# Patient Record
Sex: MALE | Race: WHITE | NOT HISPANIC OR LATINO | Employment: FULL TIME | URBAN - METROPOLITAN AREA
[De-identification: names, ages, dates, MRNs, and addresses within clinical notes are randomized per-mention and may not be internally consistent; named-entity substitution may affect disease eponyms.]

---

## 2020-12-18 ENCOUNTER — TELEPHONE (OUTPATIENT)
Dept: BEHAVIORAL HEALTH | Facility: CLINIC | Age: 35
End: 2020-12-18

## 2020-12-18 ENCOUNTER — HOSPITAL ENCOUNTER (INPATIENT)
Facility: CLINIC | Age: 35
LOS: 2 days | Discharge: HOME | End: 2020-12-20
Attending: FAMILY MEDICINE | Admitting: PSYCHIATRY & NEUROLOGY
Payer: COMMERCIAL

## 2020-12-18 DIAGNOSIS — Z20.828 CONTACT WITH AND (SUSPECTED) EXPOSURE TO OTHER VIRAL COMMUNICABLE DISEASES: ICD-10-CM

## 2020-12-18 DIAGNOSIS — F10.229 ALCOHOL DEPENDENCE WITH INTOXICATION WITH COMPLICATION (H): ICD-10-CM

## 2020-12-18 DIAGNOSIS — K21.9 GASTROESOPHAGEAL REFLUX DISEASE, UNSPECIFIED WHETHER ESOPHAGITIS PRESENT: Primary | ICD-10-CM

## 2020-12-18 LAB
ALBUMIN SERPL-MCNC: 4.4 G/DL (ref 3.4–5)
ALCOHOL BREATH TEST: 0.31 (ref 0–0.01)
ALP SERPL-CCNC: 128 U/L (ref 40–150)
ALT SERPL W P-5'-P-CCNC: 367 U/L (ref 0–70)
AMPHETAMINES UR QL SCN: NEGATIVE
ANION GAP SERPL CALCULATED.3IONS-SCNC: 17 MMOL/L (ref 3–14)
AST SERPL W P-5'-P-CCNC: 346 U/L (ref 0–45)
BARBITURATES UR QL: NEGATIVE
BASOPHILS # BLD AUTO: 0.1 10E9/L (ref 0–0.2)
BASOPHILS NFR BLD AUTO: 0.9 %
BENZODIAZ UR QL: NEGATIVE
BILIRUB SERPL-MCNC: 0.7 MG/DL (ref 0.2–1.3)
BUN SERPL-MCNC: 13 MG/DL (ref 7–30)
CALCIUM SERPL-MCNC: 9.1 MG/DL (ref 8.5–10.1)
CANNABINOIDS UR QL SCN: NEGATIVE
CHLORIDE SERPL-SCNC: 101 MMOL/L (ref 94–109)
CHOLEST SERPL-MCNC: 295 MG/DL
CO2 SERPL-SCNC: 20 MMOL/L (ref 20–32)
COCAINE UR QL: NEGATIVE
CREAT SERPL-MCNC: 0.86 MG/DL (ref 0.66–1.25)
DIFFERENTIAL METHOD BLD: ABNORMAL
EOSINOPHIL # BLD AUTO: 0 10E9/L (ref 0–0.7)
EOSINOPHIL NFR BLD AUTO: 0.3 %
ERYTHROCYTE [DISTWIDTH] IN BLOOD BY AUTOMATED COUNT: 12.7 % (ref 10–15)
ETHANOL SERPL-MCNC: 0.33 G/DL
ETHANOL UR QL SCN: POSITIVE
FLUAV+FLUBV RNA SPEC QL NAA+PROBE: NEGATIVE
FLUAV+FLUBV RNA SPEC QL NAA+PROBE: NEGATIVE
GFR SERPL CREATININE-BSD FRML MDRD: >90 ML/MIN/{1.73_M2}
GGT SERPL-CCNC: 614 U/L (ref 0–75)
GLUCOSE SERPL-MCNC: 85 MG/DL (ref 70–99)
HCT VFR BLD AUTO: 45.8 % (ref 40–53)
HDLC SERPL-MCNC: 102 MG/DL
HGB BLD-MCNC: 15.5 G/DL (ref 13.3–17.7)
IMM GRANULOCYTES # BLD: 0 10E9/L (ref 0–0.4)
IMM GRANULOCYTES NFR BLD: 0.2 %
LABORATORY COMMENT REPORT: NORMAL
LDLC SERPL CALC-MCNC: 170 MG/DL
LIPASE SERPL-CCNC: 156 U/L (ref 73–393)
LYMPHOCYTES # BLD AUTO: 1.8 10E9/L (ref 0.8–5.3)
LYMPHOCYTES NFR BLD AUTO: 20 %
MCH RBC QN AUTO: 34 PG (ref 26.5–33)
MCHC RBC AUTO-ENTMCNC: 33.8 G/DL (ref 31.5–36.5)
MCV RBC AUTO: 100 FL (ref 78–100)
MONOCYTES # BLD AUTO: 0.6 10E9/L (ref 0–1.3)
MONOCYTES NFR BLD AUTO: 6.5 %
NEUTROPHILS # BLD AUTO: 6.4 10E9/L (ref 1.6–8.3)
NEUTROPHILS NFR BLD AUTO: 72.1 %
NONHDLC SERPL-MCNC: 193 MG/DL
NRBC # BLD AUTO: 0 10*3/UL
NRBC BLD AUTO-RTO: 0 /100
OPIATES UR QL SCN: NEGATIVE
PLATELET # BLD AUTO: 255 10E9/L (ref 150–450)
POTASSIUM SERPL-SCNC: 3.9 MMOL/L (ref 3.4–5.3)
PROT SERPL-MCNC: 8.4 G/DL (ref 6.8–8.8)
RBC # BLD AUTO: 4.56 10E12/L (ref 4.4–5.9)
RSV RNA SPEC QL NAA+PROBE: NEGATIVE
SARS-COV-2 RNA SPEC QL NAA+PROBE: NEGATIVE
SODIUM SERPL-SCNC: 138 MMOL/L (ref 133–144)
SPECIMEN SOURCE: NORMAL
TRIGL SERPL-MCNC: 115 MG/DL
TSH SERPL DL<=0.005 MIU/L-ACNC: 0.48 MU/L (ref 0.4–4)
WBC # BLD AUTO: 8.8 10E9/L (ref 4–11)

## 2020-12-18 PROCEDURE — C9803 HOPD COVID-19 SPEC COLLECT: HCPCS | Performed by: FAMILY MEDICINE

## 2020-12-18 PROCEDURE — 250N000011 HC RX IP 250 OP 636: Performed by: FAMILY MEDICINE

## 2020-12-18 PROCEDURE — 258N000001 HC RX 258: Performed by: FAMILY MEDICINE

## 2020-12-18 PROCEDURE — 250N000013 HC RX MED GY IP 250 OP 250 PS 637: Performed by: NURSE PRACTITIONER

## 2020-12-18 PROCEDURE — 99285 EMERGENCY DEPT VISIT HI MDM: CPT | Mod: 25 | Performed by: FAMILY MEDICINE

## 2020-12-18 PROCEDURE — 83690 ASSAY OF LIPASE: CPT | Performed by: FAMILY MEDICINE

## 2020-12-18 PROCEDURE — 96374 THER/PROPH/DIAG INJ IV PUSH: CPT | Performed by: FAMILY MEDICINE

## 2020-12-18 PROCEDURE — 80320 DRUG SCREEN QUANTALCOHOLS: CPT | Performed by: FAMILY MEDICINE

## 2020-12-18 PROCEDURE — 82977 ASSAY OF GGT: CPT | Performed by: FAMILY MEDICINE

## 2020-12-18 PROCEDURE — 250N000009 HC RX 250: Performed by: FAMILY MEDICINE

## 2020-12-18 PROCEDURE — 87636 SARSCOV2 & INF A&B AMP PRB: CPT | Performed by: FAMILY MEDICINE

## 2020-12-18 PROCEDURE — 96375 TX/PRO/DX INJ NEW DRUG ADDON: CPT | Performed by: FAMILY MEDICINE

## 2020-12-18 PROCEDURE — 84443 ASSAY THYROID STIM HORMONE: CPT | Performed by: FAMILY MEDICINE

## 2020-12-18 PROCEDURE — 85025 COMPLETE CBC W/AUTO DIFF WBC: CPT | Performed by: FAMILY MEDICINE

## 2020-12-18 PROCEDURE — 258N000003 HC RX IP 258 OP 636: Performed by: FAMILY MEDICINE

## 2020-12-18 PROCEDURE — 250N000013 HC RX MED GY IP 250 OP 250 PS 637: Performed by: FAMILY MEDICINE

## 2020-12-18 PROCEDURE — 96361 HYDRATE IV INFUSION ADD-ON: CPT | Performed by: FAMILY MEDICINE

## 2020-12-18 PROCEDURE — 80061 LIPID PANEL: CPT | Performed by: FAMILY MEDICINE

## 2020-12-18 PROCEDURE — 82607 VITAMIN B-12: CPT | Performed by: FAMILY MEDICINE

## 2020-12-18 PROCEDURE — 80307 DRUG TEST PRSMV CHEM ANLYZR: CPT | Performed by: FAMILY MEDICINE

## 2020-12-18 PROCEDURE — 99285 EMERGENCY DEPT VISIT HI MDM: CPT | Performed by: FAMILY MEDICINE

## 2020-12-18 PROCEDURE — 82075 ASSAY OF BREATH ETHANOL: CPT | Performed by: FAMILY MEDICINE

## 2020-12-18 PROCEDURE — 80053 COMPREHEN METABOLIC PANEL: CPT | Performed by: FAMILY MEDICINE

## 2020-12-18 PROCEDURE — 128N000004 HC R&B CD ADULT

## 2020-12-18 PROCEDURE — 93005 ELECTROCARDIOGRAM TRACING: CPT | Performed by: FAMILY MEDICINE

## 2020-12-18 RX ORDER — LORAZEPAM 2 MG/ML
1 INJECTION INTRAMUSCULAR ONCE
Status: COMPLETED | OUTPATIENT
Start: 2020-12-18 | End: 2020-12-18

## 2020-12-18 RX ORDER — MAGNESIUM HYDROXIDE/ALUMINUM HYDROXICE/SIMETHICONE 120; 1200; 1200 MG/30ML; MG/30ML; MG/30ML
30 SUSPENSION ORAL ONCE
Status: COMPLETED | OUTPATIENT
Start: 2020-12-18 | End: 2020-12-18

## 2020-12-18 RX ORDER — ZOLPIDEM TARTRATE 10 MG/1
10 TABLET ORAL
Status: ON HOLD | COMMUNITY
End: 2020-12-20

## 2020-12-18 RX ORDER — MULTIPLE VITAMINS W/ MINERALS TAB 9MG-400MCG
1 TAB ORAL DAILY
Status: DISCONTINUED | OUTPATIENT
Start: 2020-12-19 | End: 2020-12-20 | Stop reason: HOSPADM

## 2020-12-18 RX ORDER — TRAZODONE HYDROCHLORIDE 50 MG/1
50 TABLET, FILM COATED ORAL
Status: DISCONTINUED | OUTPATIENT
Start: 2020-12-18 | End: 2020-12-20 | Stop reason: HOSPADM

## 2020-12-18 RX ORDER — MAGNESIUM HYDROXIDE/ALUMINUM HYDROXICE/SIMETHICONE 120; 1200; 1200 MG/30ML; MG/30ML; MG/30ML
30 SUSPENSION ORAL EVERY 4 HOURS PRN
Status: DISCONTINUED | OUTPATIENT
Start: 2020-12-18 | End: 2020-12-20 | Stop reason: HOSPADM

## 2020-12-18 RX ORDER — LANOLIN ALCOHOL/MO/W.PET/CERES
100 CREAM (GRAM) TOPICAL DAILY
Status: DISCONTINUED | OUTPATIENT
Start: 2020-12-19 | End: 2020-12-20 | Stop reason: HOSPADM

## 2020-12-18 RX ORDER — ATENOLOL 50 MG/1
50 TABLET ORAL DAILY PRN
Status: DISCONTINUED | OUTPATIENT
Start: 2020-12-18 | End: 2020-12-20 | Stop reason: HOSPADM

## 2020-12-18 RX ORDER — FOLIC ACID 1 MG/1
1 TABLET ORAL DAILY
Status: DISCONTINUED | OUTPATIENT
Start: 2020-12-19 | End: 2020-12-20 | Stop reason: HOSPADM

## 2020-12-18 RX ORDER — LORAZEPAM 1 MG/1
1 TABLET ORAL ONCE
Status: COMPLETED | OUTPATIENT
Start: 2020-12-18 | End: 2020-12-18

## 2020-12-18 RX ORDER — AMOXICILLIN 250 MG
1 CAPSULE ORAL 2 TIMES DAILY PRN
Status: DISCONTINUED | OUTPATIENT
Start: 2020-12-18 | End: 2020-12-20 | Stop reason: HOSPADM

## 2020-12-18 RX ORDER — LORAZEPAM 1 MG/1
1-4 TABLET ORAL EVERY 30 MIN PRN
Status: DISCONTINUED | OUTPATIENT
Start: 2020-12-18 | End: 2020-12-20 | Stop reason: HOSPADM

## 2020-12-18 RX ORDER — SODIUM CHLORIDE 9 MG/ML
INJECTION, SOLUTION INTRAVENOUS CONTINUOUS
Status: DISCONTINUED | OUTPATIENT
Start: 2020-12-18 | End: 2020-12-18

## 2020-12-18 RX ORDER — ONDANSETRON 4 MG/1
4 TABLET, ORALLY DISINTEGRATING ORAL EVERY 6 HOURS PRN
Status: DISCONTINUED | OUTPATIENT
Start: 2020-12-18 | End: 2020-12-20 | Stop reason: HOSPADM

## 2020-12-18 RX ORDER — LOPERAMIDE HCL 2 MG
2 CAPSULE ORAL 4 TIMES DAILY PRN
Status: DISCONTINUED | OUTPATIENT
Start: 2020-12-18 | End: 2020-12-20 | Stop reason: HOSPADM

## 2020-12-18 RX ORDER — ONDANSETRON 2 MG/ML
4 INJECTION INTRAMUSCULAR; INTRAVENOUS ONCE
Status: COMPLETED | OUTPATIENT
Start: 2020-12-18 | End: 2020-12-18

## 2020-12-18 RX ORDER — HYDROXYZINE HYDROCHLORIDE 25 MG/1
25 TABLET, FILM COATED ORAL EVERY 4 HOURS PRN
Status: DISCONTINUED | OUTPATIENT
Start: 2020-12-18 | End: 2020-12-20 | Stop reason: HOSPADM

## 2020-12-18 RX ADMIN — SODIUM CHLORIDE: 9 INJECTION, SOLUTION INTRAVENOUS at 21:10

## 2020-12-18 RX ADMIN — SODIUM CHLORIDE 1000 ML: 9 INJECTION, SOLUTION INTRAVENOUS at 19:20

## 2020-12-18 RX ADMIN — TRAZODONE HYDROCHLORIDE 50 MG: 50 TABLET ORAL at 23:24

## 2020-12-18 RX ADMIN — ONDANSETRON 4 MG: 2 INJECTION INTRAMUSCULAR; INTRAVENOUS at 19:08

## 2020-12-18 RX ADMIN — ALUMINUM HYDROXIDE, MAGNESIUM HYDROXIDE, AND SIMETHICONE 30 ML: 200; 200; 20 SUSPENSION ORAL at 21:40

## 2020-12-18 RX ADMIN — LORAZEPAM 1 MG: 1 TABLET ORAL at 21:40

## 2020-12-18 RX ADMIN — LORAZEPAM 2 MG: 1 TABLET ORAL at 23:24

## 2020-12-18 RX ADMIN — ALUMINUM HYDROXIDE, MAGNESIUM HYDROXIDE, AND SIMETHICONE 30 ML: 200; 200; 20 SUSPENSION ORAL at 19:08

## 2020-12-18 RX ADMIN — THIAMINE HYDROCHLORIDE: 100 INJECTION, SOLUTION INTRAMUSCULAR; INTRAVENOUS at 18:46

## 2020-12-18 RX ADMIN — LORAZEPAM 1 MG: 2 INJECTION INTRAMUSCULAR; INTRAVENOUS at 21:09

## 2020-12-18 RX ADMIN — ALUMINUM HYDROXIDE, MAGNESIUM HYDROXIDE, AND SIMETHICONE 30 ML: 200; 200; 20 SUSPENSION ORAL at 23:24

## 2020-12-18 ASSESSMENT — ACTIVITIES OF DAILY LIVING (ADL)
ORAL_HYGIENE: INDEPENDENT
HYGIENE/GROOMING: INDEPENDENT
DRESS: INDEPENDENT
LAUNDRY: WITH SUPERVISION

## 2020-12-18 ASSESSMENT — ENCOUNTER SYMPTOMS
VOMITING: 0
FEVER: 0
NAUSEA: 0
SHORTNESS OF BREATH: 0
ABDOMINAL PAIN: 0

## 2020-12-18 NOTE — ED TRIAGE NOTES
Pt here for detox and his last drink was at 10 am today.  Pt states no hx of seizures coming off alcohol.

## 2020-12-18 NOTE — ED PROVIDER NOTES
ED Provider Note  [facility]  History     Chief Complaint   Patient presents with     Drug / Alcohol Assessment     The history is provided by the patient and medical records.     Abhijit Saavedra is a 35 year old male who presents to the ED today seeking alcohol detox. He was last sober 12 years ago. He typically drinks a little before and after work. 7 months ago he tried to go sober for a week but no other periods of sobriety. 3 weeks ago he started drinking really bad. He is drinking 0.75L vodka a day.   The patient reports his last drink at 10 am. He denies h/o withdrawal seizure. He notes having had a spell but was awake and alert the whole time.  He has never done detox before. He is interested in treatment after detox. As for medical history he notes having had a rapid heart rate. He has not gone to his primary care doctor for this. No history of hypertension. Denies fever, cough, shortness of air, abdominal pain, dysuria, hematuria, rash, vomiting or diarrhea. No numbness, weakness, tingling. No suicidal or homicidal ideation.       Past Medical History  Past Medical History:   Diagnosis Date     Alcohol use      Obstructive sleep apnea      History reviewed. No pertinent surgical history.  No current outpatient medications on file.    Allergies   Allergen Reactions     Amoxicillin Rash     Penicillins Rash     Family History  No family history on file.  Social History   Social History     Tobacco Use     Smoking status: Never Smoker     Smokeless tobacco: Never Used   Substance Use Topics     Alcohol use: Yes     Alcohol/week: 10.0 standard drinks     Types: 10 Shots of liquor per week     Comment: heavily, daily      Drug use: Never      Past medical history, past surgical history, medications, allergies, family history, and social history were reviewed with the patient. No additional pertinent items.       Review of Systems   Constitutional: Negative for fever.   Respiratory:  Negative for shortness of breath.    Cardiovascular: Negative for chest pain.   Gastrointestinal: Negative for abdominal pain, nausea and vomiting.   Psychiatric/Behavioral: Negative for suicidal ideas.   All other systems reviewed and are negative.    A complete review of systems was performed with pertinent positives and negatives noted in the HPI, and all other systems negative.    Physical Exam   BP: 134/84  Pulse: 133  Temp: 98.1  F (36.7  C)  Resp: 18  SpO2: 100 %  Physical Exam  Constitutional:       General: He is not in acute distress.     Appearance: He is not diaphoretic.   HENT:      Head: Atraumatic.   Eyes:      General: No scleral icterus.     Pupils: Pupils are equal, round, and reactive to light.   Cardiovascular:      Heart sounds: Normal heart sounds.   Pulmonary:      Effort: No respiratory distress.      Breath sounds: Normal breath sounds.   Abdominal:      General: Bowel sounds are normal.      Palpations: Abdomen is soft.      Tenderness: There is no abdominal tenderness.   Musculoskeletal:         General: No tenderness.   Skin:     General: Skin is warm.      Findings: No rash.   Neurological:      General: No focal deficit present.      Mental Status: He is oriented to person, place, and time.      Motor: No weakness.      Coordination: Coordination normal.   Psychiatric:         Mood and Affect: Mood is anxious.         Thought Content: Thought content does not include homicidal or suicidal ideation.         ED Course      Procedures          Results for orders placed or performed during the hospital encounter of 12/18/20   Drug abuse screen 6 urine (tox)     Status: Abnormal   Result Value Ref Range    Amphetamine Qual Urine Negative NEG^Negative    Barbiturates Qual Urine Negative NEG^Negative    Benzodiazepine Qual Urine Negative NEG^Negative    Cannabinoids Qual Urine Negative NEG^Negative    Cocaine Qual Urine Negative NEG^Negative    Ethanol Qual Urine Positive (A) NEG^Negative     Opiates Qualitative Urine Negative NEG^Negative   CBC with platelets differential     Status: Abnormal   Result Value Ref Range    WBC 8.8 4.0 - 11.0 10e9/L    RBC Count 4.56 4.4 - 5.9 10e12/L    Hemoglobin 15.5 13.3 - 17.7 g/dL    Hematocrit 45.8 40.0 - 53.0 %     78 - 100 fl    MCH 34.0 (H) 26.5 - 33.0 pg    MCHC 33.8 31.5 - 36.5 g/dL    RDW 12.7 10.0 - 15.0 %    Platelet Count 255 150 - 450 10e9/L    Diff Method Automated Method     % Neutrophils 72.1 %    % Lymphocytes 20.0 %    % Monocytes 6.5 %    % Eosinophils 0.3 %    % Basophils 0.9 %    % Immature Granulocytes 0.2 %    Nucleated RBCs 0 0 /100    Absolute Neutrophil 6.4 1.6 - 8.3 10e9/L    Absolute Lymphocytes 1.8 0.8 - 5.3 10e9/L    Absolute Monocytes 0.6 0.0 - 1.3 10e9/L    Absolute Eosinophils 0.0 0.0 - 0.7 10e9/L    Absolute Basophils 0.1 0.0 - 0.2 10e9/L    Abs Immature Granulocytes 0.0 0 - 0.4 10e9/L    Absolute Nucleated RBC 0.0    Comprehensive metabolic panel     Status: Abnormal   Result Value Ref Range    Sodium 138 133 - 144 mmol/L    Potassium 3.9 3.4 - 5.3 mmol/L    Chloride 101 94 - 109 mmol/L    Carbon Dioxide 20 20 - 32 mmol/L    Anion Gap 17 (H) 3 - 14 mmol/L    Glucose 85 70 - 99 mg/dL    Urea Nitrogen 13 7 - 30 mg/dL    Creatinine 0.86 0.66 - 1.25 mg/dL    GFR Estimate >90 >60 mL/min/[1.73_m2]    GFR Estimate If Black >90 >60 mL/min/[1.73_m2]    Calcium 9.1 8.5 - 10.1 mg/dL    Bilirubin Total 0.7 0.2 - 1.3 mg/dL    Albumin 4.4 3.4 - 5.0 g/dL    Protein Total 8.4 6.8 - 8.8 g/dL    Alkaline Phosphatase 128 40 - 150 U/L     (H) 0 - 70 U/L     (H) 0 - 45 U/L   Lipase     Status: None   Result Value Ref Range    Lipase 156 73 - 393 U/L   Alcohol ethyl     Status: Abnormal   Result Value Ref Range    Ethanol g/dL 0.33 (HH) <0.01 g/dL   Asymptomatic Influenza A/B & SARS-CoV2 (COVID-19) Virus PCR Multiplex     Status: None    Specimen: Nasopharyngeal   Result Value Ref Range    Flu A/B & SARS-COV-2 PCR Source Nasopharyngeal      SARS-CoV-2 PCR Result NEGATIVE     Influenza A PCR Negative NEG^Negative    Influenza B PCR Negative NEG^Negative    Respiratory Syncytial Virus PCR Negative NEG^Negative    Flu A/B & SARS-CoV-2 PCR Comment (Note)    Lipid Profile     Status: Abnormal   Result Value Ref Range    Cholesterol 295 (H) <200 mg/dL    Triglycerides 115 <150 mg/dL    HDL Cholesterol 102 >39 mg/dL    LDL Cholesterol Calculated 170 (H) <100 mg/dL    Non HDL Cholesterol 193 (H) <130 mg/dL   GGT     Status: Abnormal   Result Value Ref Range     (H) 0 - 75 U/L   TSH with free T4 reflex     Status: None   Result Value Ref Range    TSH 0.48 0.40 - 4.00 mU/L   Vitamin B12     Status: None   Result Value Ref Range    Vitamin B12 618 193 - 986 pg/mL   Comprehensive metabolic panel     Status: Abnormal   Result Value Ref Range    Sodium 135 133 - 144 mmol/L    Potassium 3.8 3.4 - 5.3 mmol/L    Chloride 101 94 - 109 mmol/L    Carbon Dioxide 29 20 - 32 mmol/L    Anion Gap 5 3 - 14 mmol/L    Glucose 119 (H) 70 - 99 mg/dL    Urea Nitrogen 9 7 - 30 mg/dL    Creatinine 0.83 0.66 - 1.25 mg/dL    GFR Estimate >90 >60 mL/min/[1.73_m2]    GFR Estimate If Black >90 >60 mL/min/[1.73_m2]    Calcium 8.1 (L) 8.5 - 10.1 mg/dL    Bilirubin Total 0.9 0.2 - 1.3 mg/dL    Albumin 3.9 3.4 - 5.0 g/dL    Protein Total 7.6 6.8 - 8.8 g/dL    Alkaline Phosphatase 109 40 - 150 U/L     (H) 0 - 70 U/L     (H) 0 - 45 U/L   EKG 12-lead, tracing only     Status: None (Preliminary result)   Result Value Ref Range    Interpretation ECG Click View Image link to view waveform and result    Internal Medicine Adult IP Consult for [facility] Detox on 3A: Patient to be seen: Routine within 24 hrs; Call back #: [phone]; evaluate general medical condition; Consultant may enter orders: Yes; Requesting provider? Attending physician     Status: None ()    Narrative    Sunshine Levi PA-C     12/19/2020 10:46 AM  Brief Medicine Note    This note was made to fulfill the  consult order. Please see   progress note from writer for complete details.     Sunshine Levi PA-C  Hospitalist Service  Pager [phone]       Alcohol breath test POCT     Status: Abnormal   Result Value Ref Range    Alcohol Breath Test 0.314 (A) 0.00 - 0.01     Medications   LORazepam (ATIVAN) tablet 1-4 mg (1 mg Oral Given 12/19/20 1611)   atenolol (TENORMIN) tablet 50 mg (50 mg Oral Given 12/19/20 0159)   thiamine (B-1) tablet 100 mg (100 mg Oral Given 12/19/20 0856)   folic acid (FOLVITE) tablet 1 mg (1 mg Oral Given 12/19/20 0856)   multivitamin w/minerals (THERA-VIT-M) tablet 1 tablet (1 tablet Oral Given 12/19/20 0856)   nicotine (NICORETTE) gum 2 mg (has no administration in time range)   alum & mag hydroxide-simethicone (MAALOX) suspension 30 mL (30 mLs Oral Given 12/19/20 1216)   senna-docusate (SENOKOT-S/PERICOLACE) 8.6-50 MG per tablet 1 tablet (has no administration in time range)   traZODone (DESYREL) tablet 50 mg (50 mg Oral Given 12/19/20 0159)   hydrOXYzine (ATARAX) tablet 25 mg (25 mg Oral Given 12/19/20 0159)   ondansetron (ZOFRAN-ODT) ODT tab 4 mg (has no administration in time range)   loperamide (IMODIUM) capsule 2 mg (has no administration in time range)   pantoprazole (PROTONIX) EC tablet 40 mg (40 mg Oral Given 12/19/20 1611)   dextrose 5% and 0.45% NaCl 1,000 mL with Infuvite Adult 10 mL, thiamine 100 mg, folic acid 1 mg infusion ( Intravenous Stopped 12/18/20 1921)   0.9% sodium chloride BOLUS (0 mLs Intravenous Stopped 12/18/20 2040)   ondansetron (ZOFRAN) injection 4 mg (4 mg Intravenous Given 12/18/20 1908)   alum & mag hydroxide-simethicone (MAALOX) suspension 30 mL (30 mLs Oral Given 12/18/20 1908)   LORazepam (ATIVAN) injection 1 mg (1 mg Intravenous Given 12/18/20 2109)   LORazepam (ATIVAN) tablet 1 mg (1 mg Oral Given 12/18/20 2140)   alum & mag hydroxide-simethicone (MAALOX) suspension 30 mL (30 mLs Oral Given 12/18/20 2140)        Assessments & Plan (with Medical Decision  Making)       I have reviewed the nursing notes. I have reviewed the findings, diagnosis, plan and need for follow up with the patient.    Patient with chronic alcohol dependence acute intoxicated seeking detox at this time will be admitted to station 3 a for medically supervised detox and treatment.    Final diagnoses:   Alcohol dependence with intoxication with complication (H)   ,I, Diana Allen, am serving as a trained medical scribe to document services personally performed by Jakob Simpson MD based on the provider's statements to me on December 18, 2020.  This document has been checked and approved by the attending provider.    I, Jakob Simpson MD, was physically present and have reviewed and verified the accuracy of this note documented by Diana Allen, medical scribe.       --  Jakob Simpson MD  [facility] EMERGENCY DEPARTMENT  12/18/2020     Jakob Simpson MD  12/19/20 [id number]

## 2020-12-19 LAB
ALBUMIN SERPL-MCNC: 3.9 G/DL (ref 3.4–5)
ALP SERPL-CCNC: 109 U/L (ref 40–150)
ALT SERPL W P-5'-P-CCNC: 268 U/L (ref 0–70)
ANION GAP SERPL CALCULATED.3IONS-SCNC: 5 MMOL/L (ref 3–14)
AST SERPL W P-5'-P-CCNC: 183 U/L (ref 0–45)
BILIRUB SERPL-MCNC: 0.9 MG/DL (ref 0.2–1.3)
BUN SERPL-MCNC: 9 MG/DL (ref 7–30)
CALCIUM SERPL-MCNC: 8.1 MG/DL (ref 8.5–10.1)
CHLORIDE SERPL-SCNC: 101 MMOL/L (ref 94–109)
CO2 SERPL-SCNC: 29 MMOL/L (ref 20–32)
CREAT SERPL-MCNC: 0.83 MG/DL (ref 0.66–1.25)
GFR SERPL CREATININE-BSD FRML MDRD: >90 ML/MIN/{1.73_M2}
GLUCOSE SERPL-MCNC: 119 MG/DL (ref 70–99)
INTERPRETATION ECG - MUSE: NORMAL
POTASSIUM SERPL-SCNC: 3.8 MMOL/L (ref 3.4–5.3)
PROT SERPL-MCNC: 7.6 G/DL (ref 6.8–8.8)
SODIUM SERPL-SCNC: 135 MMOL/L (ref 133–144)
VIT B12 SERPL-MCNC: 618 PG/ML (ref 193–986)

## 2020-12-19 PROCEDURE — 36415 COLL VENOUS BLD VENIPUNCTURE: CPT | Performed by: PHYSICIAN ASSISTANT

## 2020-12-19 PROCEDURE — 99207 PR CONSULT E&M CHANGED TO INITIAL LEVEL: CPT | Performed by: PHYSICIAN ASSISTANT

## 2020-12-19 PROCEDURE — H2032 ACTIVITY THERAPY, PER 15 MIN: HCPCS

## 2020-12-19 PROCEDURE — 99222 1ST HOSP IP/OBS MODERATE 55: CPT | Mod: 95 | Performed by: PSYCHIATRY & NEUROLOGY

## 2020-12-19 PROCEDURE — 99222 1ST HOSP IP/OBS MODERATE 55: CPT | Performed by: PHYSICIAN ASSISTANT

## 2020-12-19 PROCEDURE — 250N000013 HC RX MED GY IP 250 OP 250 PS 637: Performed by: NURSE PRACTITIONER

## 2020-12-19 PROCEDURE — HZ2ZZZZ DETOXIFICATION SERVICES FOR SUBSTANCE ABUSE TREATMENT: ICD-10-PCS | Performed by: PSYCHIATRY & NEUROLOGY

## 2020-12-19 PROCEDURE — 128N000004 HC R&B CD ADULT

## 2020-12-19 PROCEDURE — 250N000013 HC RX MED GY IP 250 OP 250 PS 637: Performed by: PHYSICIAN ASSISTANT

## 2020-12-19 PROCEDURE — 80053 COMPREHEN METABOLIC PANEL: CPT | Performed by: PHYSICIAN ASSISTANT

## 2020-12-19 RX ORDER — IBUPROFEN 200 MG
400 TABLET ORAL DAILY PRN
Status: ON HOLD | COMMUNITY
End: 2021-07-16

## 2020-12-19 RX ORDER — PANTOPRAZOLE SODIUM 40 MG/1
40 TABLET, DELAYED RELEASE ORAL
Status: DISCONTINUED | OUTPATIENT
Start: 2020-12-19 | End: 2020-12-20 | Stop reason: HOSPADM

## 2020-12-19 RX ORDER — PANTOPRAZOLE SODIUM 40 MG/1
40 TABLET, DELAYED RELEASE ORAL
Status: DISCONTINUED | OUTPATIENT
Start: 2020-12-20 | End: 2020-12-19

## 2020-12-19 RX ORDER — CALCIUM CARBONATE 500 MG/1
2 TABLET, CHEWABLE ORAL
Status: ON HOLD | COMMUNITY
End: 2021-07-16

## 2020-12-19 RX ADMIN — FOLIC ACID 1 MG: 1 TABLET ORAL at 08:56

## 2020-12-19 RX ADMIN — HYDROXYZINE HYDROCHLORIDE 25 MG: 25 TABLET, FILM COATED ORAL at 22:27

## 2020-12-19 RX ADMIN — ALUMINUM HYDROXIDE, MAGNESIUM HYDROXIDE, AND SIMETHICONE 30 ML: 200; 200; 20 SUSPENSION ORAL at 20:31

## 2020-12-19 RX ADMIN — LORAZEPAM 2 MG: 1 TABLET ORAL at 12:16

## 2020-12-19 RX ADMIN — LORAZEPAM 1 MG: 1 TABLET ORAL at 16:11

## 2020-12-19 RX ADMIN — LORAZEPAM 2 MG: 1 TABLET ORAL at 01:59

## 2020-12-19 RX ADMIN — NICOTINE POLACRILEX 2 MG: 2 GUM, CHEWING BUCCAL at 18:07

## 2020-12-19 RX ADMIN — TRAZODONE HYDROCHLORIDE 50 MG: 50 TABLET ORAL at 22:27

## 2020-12-19 RX ADMIN — ATENOLOL 50 MG: 50 TABLET ORAL at 01:59

## 2020-12-19 RX ADMIN — MULTIPLE VITAMINS W/ MINERALS TAB 1 TABLET: TAB at 08:56

## 2020-12-19 RX ADMIN — HYDROXYZINE HYDROCHLORIDE 25 MG: 25 TABLET, FILM COATED ORAL at 01:59

## 2020-12-19 RX ADMIN — LORAZEPAM 2 MG: 1 TABLET ORAL at 06:07

## 2020-12-19 RX ADMIN — LORAZEPAM 2 MG: 1 TABLET ORAL at 08:56

## 2020-12-19 RX ADMIN — Medication 100 MG: at 08:56

## 2020-12-19 RX ADMIN — ALUMINUM HYDROXIDE, MAGNESIUM HYDROXIDE, AND SIMETHICONE 30 ML: 200; 200; 20 SUSPENSION ORAL at 12:16

## 2020-12-19 RX ADMIN — PANTOPRAZOLE SODIUM 40 MG: 40 TABLET, DELAYED RELEASE ORAL at 16:11

## 2020-12-19 RX ADMIN — TRAZODONE HYDROCHLORIDE 50 MG: 50 TABLET ORAL at 01:59

## 2020-12-19 ASSESSMENT — ACTIVITIES OF DAILY LIVING (ADL)
DRESS: INDEPENDENT
LAUNDRY: WITH SUPERVISION
ORAL_HYGIENE: INDEPENDENT
DRESS: INDEPENDENT
HYGIENE/GROOMING: INDEPENDENT
HYGIENE/GROOMING: INDEPENDENT
ORAL_HYGIENE: INDEPENDENT

## 2020-12-19 NOTE — PROGRESS NOTES
Internal Medicine Initial Visit      Abhijit Saavedra MRN# [id number]   YOB: 1985 Age: 35 year old   Date of Admission: 12/18/2020  PCP: No Ref-Primary, Physician    Referring Provider: Behavioral Health - Rita Carter MD  Reason for Visit: General Medical Evaluation          Assessment and Recommendations:   Abhijit Saavedra is a 35 year old with a history of alcohol use disorder and mild CLAUDETTE who is admitted to station 3A with alcohol withdrawal. Internal Medicine consultation was ordered by Dr. Carter for comanagement of alcohol withdrawal.        # Alcohol use disorder with acute withdrawal:  Has been drinking heavily for the past 1 month, drinking about 7-10 shots per day. Has had a history of alcohol dependence for ~12 years. Last drink was PTA. ABT 0.33 on admission. Utox positive for ethanol. Denies hx of CIPRIANO or DTs.   -Management per psychiatry team.   -Agree with MVI, folic acid and thiamine supplementation    # Mild CLAUDETTE:   Follows with sleep medicine. Had recent sleep study with mild CLAUDETTE. Was recommended to start on CPAP - he has yet to pick this up.   - Follow up with sleep medicine provider as outpatient to start CPAP for CLAUDETTE.     # AGMA, resolved:  AG 17, CO2 20 on admission. Glucose WNL. Likely in setting of alcohol ketosis/dehydration. S/p IVF in the ED. Repeat CMP today with improved AG to 5.   - Encourage fluid intake     # Transaminitis:  , , AlkP and T bili WNL. . CMP today with improved ALT to 268 and . Denies hx of IVDU, tylenol use or OTC herbal supplements. No abdominal pain, N/V. Likely in setting of alcohol use.   - Given LFT improvement today- no need for further LFT trends while in patient  - Recommend follow up with PCP within 7 days for repeat CMP to monitor LFTs to ensure resolution  - Please notify medicine with any new abdominal pain, N/V or fever >100.4F     # Abnormal Lipid Panel:  , , Non HDL cholesterol 193,  "Triglycerides 115.   - Follow up w/ PCP for repeat fasting lipid panel  - Lifestyle modifications discussed, alcohol cessation     # Elevated blood pressure in setting of alcohol withdrawal:  /94. Likely elevated in setting of alcohol withdrawal. Denies hx of HTN. Not on antihypertensives in the past.  - Monitor, please notify medicine if SBP >140 or DBP >90 if out of withdrawal period or SBP >175 or DBP >110     # Acid reflux  # Dark stools, resolved:  Patient reports episodic acid reflux. He also reports a history of dark/black colored stools ~2 weeks ago that was isolated event without recurrence. Denies current hematemesis, melena or hematochezia. Denies endoscopic procedures. No epigastric pain or ttp. Hgb stable at 15.5.   - Start Pantoprazole 40mg daily  - Follow up with PCP and referral to GI given upon discharge for follow-up     Medicine will follow up on BP peripherally, please page with any additional concerns.     Sunshine Levi PA-C  Hospitalist Service   Pager: [phone]     Reason for Admission:   Alcohol withdrawal         History of Present Illness:   History is obtained from the patient and medical record.     Abhijit Saavedra is a 35 year old with a history of alcohol use disorder and mild CLAUDETTE who is admitted to station 3A with alcohol withdrawal. Internal Medicine consultation was ordered by Dr. Carter for comanagement of alcohol withdrawal.      Patient reports a history of alcohol dependence for ~12 years. He has recently increased his drinking over the past 1 month and has been drinking 7-10 shots per day. Last drink PTA. He denies any hx of CIPRIANO or DTs. He states he is currently staying at his parents house. Recently had fight with his girlfriend. He states that it seems like his drinking has spiraled out of control. He is currently working as a  at a car dealership. He denies any tobacco use or IVDU or illicit drug use.     Currently, he reports feeling \"foggy,\" and " a little off balance due to his withdrawal symptoms. Denies any chest pain, SOB, abdominal pain, N/V. He denies any falls, headache or head injury. He notes intermittent acid reflux symptoms, and also notes that ~ 2 weeks ago he had a dark/black colored stool that was an isolated event without recurrence. Denies any hematemesis, hematochezia or current melena. He is not on a PPI.    He recently saw a sleep medicine provider and was diagnosed with mild CLAUDETTE. He is due to  a CPAP machine to start this at bedtime, but has not done so yet. He also has prescription for Ambien but rarely takes it.     He otherwise denies any dysuria, frequency, urgency, fever or chills or paresthesias.          Review of Systems:    ROS: 10 point ROS neg other than the symptoms noted above in the HPI.            Past Medical History:   Reviewed and updated in Epic.  Past Medical History:   Diagnosis Date     Alcohol use      Obstructive sleep apnea              Past Surgical History:   Reviewed and updated in Epic.  History reviewed. No pertinent surgical history.   Hand surgery ~ 20 years ago           Social History:   Lives with his parents.  for car dealership.   Social History     Tobacco Use     Smoking status: Never Smoker     Smokeless tobacco: Never Used   Substance Use Topics     Alcohol use: Yes     Alcohol/week: 10.0 standard drinks     Types: 10 Shots of liquor per week     Comment: heavily, daily      Drug use: Never             Family History:   Reviewed and updated in Epic.  No family history on file.          Allergies:     Allergies   Allergen Reactions     Amoxicillin Rash     Penicillins Rash             Medications:     Medications Prior to Admission   Medication Sig Dispense Refill Last Dose     zolpidem (AMBIEN) 10 MG tablet Take 10 mg by mouth nightly as needed for sleep Pt states that he has been taking 20mg the last two days   12/17/2020 at Unknown time        Current Facility-Administered  Medications   Medication     alum & mag hydroxide-simethicone (MAALOX) suspension 30 mL     atenolol (TENORMIN) tablet 50 mg     folic acid (FOLVITE) tablet 1 mg     hydrOXYzine (ATARAX) tablet 25 mg     loperamide (IMODIUM) capsule 2 mg     LORazepam (ATIVAN) tablet 1-4 mg     multivitamin w/minerals (THERA-VIT-M) tablet 1 tablet     nicotine (NICORETTE) gum 2 mg     ondansetron (ZOFRAN-ODT) ODT tab 4 mg     [START ON 12/20/2020] pantoprazole (PROTONIX) EC tablet 40 mg     senna-docusate (SENOKOT-S/PERICOLACE) 8.6-50 MG per tablet 1 tablet     thiamine (B-1) tablet 100 mg     traZODone (DESYREL) tablet 50 mg            Physical Exam:   Blood pressure (!) 135/94, pulse 109, temperature 98.7  F (37.1  C), temperature source Temporal, resp. rate 16, SpO2 95 %.  There is no height or weight on file to calculate BMI.  GENERAL: Alert and oriented x 3. NAD. Sitting up in bed resting comfortably.   HEENT: Anicteric sclera. Mucous membranes moist.   CV: RRR. S1, S2. No murmurs appreciated.   RESPIRATORY: Effort normal on room air. Lungs CTAB with no wheezing, rales, rhonchi.   GI: Abdomen soft, non distended, non tender. No guarding, rigidity or rebound tenderness. Negative duque's sign.   MUSCULOSKELETAL: No joint swelling or tenderness.  NEUROLOGICAL: No focal deficits. Moves all extremities. PERRLA. CN III-XII grossly intact. No dysdiadochokinesia.  EXTREMITIES: No peripheral edema. Intact bilateral pedal pulses.   SKIN: No jaundice. No rashes.           Data:   CBC:  Recent Labs   Lab Test 12/18/20  1820   WBC 8.8   RBC 4.56   HGB 15.5   HCT 45.8      MCH 34.0*   MCHC 33.8   RDW 12.7          CMP:  Recent Labs   Lab Test 12/18/20  1820      POTASSIUM 3.9   CHLORIDE 101   YNES 9.1   CO2 20   BUN 13   CR 0.86   GLC 85   *   *   BILITOTAL 0.7   ALBUMIN 4.4   PROTTOTAL 8.4   ALKPHOS 128       TSH:  TSH   Date Value Ref Range Status   12/18/2020 0.48 0.40 - 4.00 mU/L Final       Tox screen:  positive for ethanol     Unresulted Labs Ordered in the Past 30 Days of this Admission     No orders found for last 31 day(s).

## 2020-12-19 NOTE — PLAN OF CARE
Pt continues to be monitored for alcohol withdrawal. MSSA this = 8 and 8. He received lorazepam 2 mg x 2. He is pleasant and cooperative. He denies SI/SIB/HI.  His discharge plan has not been determined.  /85   Pulse 103   Temp 97.9  F (36.6  C) (Temporal)   Resp 16   SpO2 95%

## 2020-12-19 NOTE — H&P
"[facility], [place]   Psychiatric History and Physical  Admission date: 12/18/2020        Chief Complaint:   \"Alcohol\"        HPI:     The patient is a 34yo male with a history of alcohol use disorder and depression who was admitted after drinking up to one liter of hard alcohol daily. BAL was 0.33 on admission. LFTs are elevated. Says that he is \"not doing that well.\" Didn't sleep \"at all.\" Feels that he has poor coordination today and some nausea. Denies any withdrawal seizures. Feels that he has been hearing \"weird noises\" and his name being called. Feeling guilty about his relapse. Is open to CD treatment and anti-craving medications. Denies SI.      Per ER:    Abhijit Saavedra is a 35 year old male who presents to the ED today seeking alcohol detox. He was last sober 12 years ago. He typically drinks a little before and after work. 7 months ago he tried to go sober for a week but no other periods of sobriety. 3 weeks ago he started drinking really bad. He is drinking 0.75L vodka a day.   The patient reports his last drink at 10 am. He denies h/o withdrawal seizure. He notes having had a spell but was awake and alert the whole time.  He has never done detox before. He is interested in treatment after detox. As for medical history he notes having had a rapid heart rate. He has not gone to his primary care doctor for this. No history of hypertension. Denies fever, cough, shortness of air, abdominal pain, dysuria, hematuria, rash, vomiting or diarrhea. No numbness, weakness, tingling. No suicidal or homicidal ideation.           Past Psychiatric History:     No psychiatric hospitalizations.   No history of suicide attempts.   Says that he was on antidepressants recently \"but I didn't like them.\"         Substance Use and History:     Alcohol use disorder. Denies a history of withdrawal seizures.    Recent Ambien use.   Non smoker.         Past Medical History:   PAST MEDICAL HISTORY: " "History reviewed. No pertinent past medical history.    PAST SURGICAL HISTORY: History reviewed. No pertinent surgical history.          Family History:   FAMILY HISTORY: \"everyone drinks in my family.\"         Social History:   Please see the full psychosocial profile from the clinical treatment coordinator.   SOCIAL HISTORY:   Social History     Tobacco Use     Smoking status: Never Smoker     Smokeless tobacco: Never Used   Substance Use Topics     Alcohol use: Yes     Alcohol/week: 10.0 standard drinks     Types: 10 Shots of liquor per week     Comment: heavily, daily             Physical ROS:   The patient endorsed nausea. The remainder of 10-point review of systems was negative except as noted in HPI.         PTA Medications:     Medications Prior to Admission   Medication Sig Dispense Refill Last Dose     zolpidem (AMBIEN) 10 MG tablet Take 10 mg by mouth nightly as needed for sleep Pt states that he has been taking 20mg the last two days   12/17/2020 at Unknown time          Allergies:     Allergies   Allergen Reactions     Penicillins Rash          Labs:     Recent Results (from the past 48 hour(s))   Alcohol breath test POCT    Collection Time: 12/18/20  5:05 PM   Result Value Ref Range    Alcohol Breath Test 0.314 (A) 0.00 - 0.01   CBC with platelets differential    Collection Time: 12/18/20  6:20 PM   Result Value Ref Range    WBC 8.8 4.0 - 11.0 10e9/L    RBC Count 4.56 4.4 - 5.9 10e12/L    Hemoglobin 15.5 13.3 - 17.7 g/dL    Hematocrit 45.8 40.0 - 53.0 %     78 - 100 fl    MCH 34.0 (H) 26.5 - 33.0 pg    MCHC 33.8 31.5 - 36.5 g/dL    RDW 12.7 10.0 - 15.0 %    Platelet Count 255 150 - 450 10e9/L    Diff Method Automated Method     % Neutrophils 72.1 %    % Lymphocytes 20.0 %    % Monocytes 6.5 %    % Eosinophils 0.3 %    % Basophils 0.9 %    % Immature Granulocytes 0.2 %    Nucleated RBCs 0 0 /100    Absolute Neutrophil 6.4 1.6 - 8.3 10e9/L    Absolute Lymphocytes 1.8 0.8 - 5.3 10e9/L    Absolute " Monocytes 0.6 0.0 - 1.3 10e9/L    Absolute Eosinophils 0.0 0.0 - 0.7 10e9/L    Absolute Basophils 0.1 0.0 - 0.2 10e9/L    Abs Immature Granulocytes 0.0 0 - 0.4 10e9/L    Absolute Nucleated RBC 0.0    Comprehensive metabolic panel    Collection Time: 12/18/20  6:20 PM   Result Value Ref Range    Sodium 138 133 - 144 mmol/L    Potassium 3.9 3.4 - 5.3 mmol/L    Chloride 101 94 - 109 mmol/L    Carbon Dioxide 20 20 - 32 mmol/L    Anion Gap 17 (H) 3 - 14 mmol/L    Glucose 85 70 - 99 mg/dL    Urea Nitrogen 13 7 - 30 mg/dL    Creatinine 0.86 0.66 - 1.25 mg/dL    GFR Estimate >90 >60 mL/min/[1.73_m2]    GFR Estimate If Black >90 >60 mL/min/[1.73_m2]    Calcium 9.1 8.5 - 10.1 mg/dL    Bilirubin Total 0.7 0.2 - 1.3 mg/dL    Albumin 4.4 3.4 - 5.0 g/dL    Protein Total 8.4 6.8 - 8.8 g/dL    Alkaline Phosphatase 128 40 - 150 U/L     (H) 0 - 70 U/L     (H) 0 - 45 U/L   Lipase    Collection Time: 12/18/20  6:20 PM   Result Value Ref Range    Lipase 156 73 - 393 U/L   Alcohol ethyl    Collection Time: 12/18/20  6:20 PM   Result Value Ref Range    Ethanol g/dL 0.33 (HH) <0.01 g/dL   Lipid Profile    Collection Time: 12/18/20  6:20 PM   Result Value Ref Range    Cholesterol 295 (H) <200 mg/dL    Triglycerides 115 <150 mg/dL    HDL Cholesterol 102 >39 mg/dL    LDL Cholesterol Calculated 170 (H) <100 mg/dL    Non HDL Cholesterol 193 (H) <130 mg/dL   GGT    Collection Time: 12/18/20  6:20 PM   Result Value Ref Range     (H) 0 - 75 U/L   TSH with free T4 reflex    Collection Time: 12/18/20  6:20 PM   Result Value Ref Range    TSH 0.48 0.40 - 4.00 mU/L   Drug abuse screen 6 urine (tox)    Collection Time: 12/18/20  6:34 PM   Result Value Ref Range    Amphetamine Qual Urine Negative NEG^Negative    Barbiturates Qual Urine Negative NEG^Negative    Benzodiazepine Qual Urine Negative NEG^Negative    Cannabinoids Qual Urine Negative NEG^Negative    Cocaine Qual Urine Negative NEG^Negative    Ethanol Qual Urine Positive (A)  NEG^Negative    Opiates Qualitative Urine Negative NEG^Negative   EKG 12-lead, tracing only    Collection Time: 12/18/20  6:56 PM   Result Value Ref Range    Interpretation ECG Click View Image link to view waveform and result    Asymptomatic Influenza A/B & SARS-CoV2 (COVID-19) Virus PCR Multiplex    Collection Time: 12/18/20  7:14 PM    Specimen: Nasopharyngeal   Result Value Ref Range    Flu A/B & SARS-COV-2 PCR Source Nasopharyngeal     SARS-CoV-2 PCR Result NEGATIVE     Influenza A PCR Negative NEG^Negative    Influenza B PCR Negative NEG^Negative    Respiratory Syncytial Virus PCR Negative NEG^Negative    Flu A/B & SARS-CoV-2 PCR Comment (Note)           Physical and Psychiatric Examination:     BP (!) 138/95   Pulse 144   Temp 98.2  F (36.8  C) (Temporal)   Resp 16   SpO2 94%   Weight is 0 lbs 0 oz  There is no height or weight on file to calculate BMI.    Physical Exam:  I have reviewed the physical exam as documented by by the medical team and agree with findings and assessment and have no additional findings to add at this time.    Mental Status Exam:  Appearance: awake, alert and adequately groomed  Attitude:  cooperative  Eye Contact:  good  Mood:  anxious and depressed  Affect:  mood congruent  Speech:  clear, coherent  Language: fluent and intact in English  Psychomotor, Gait, Musculoskeletal:  no evidence of tardive dyskinesia, dystonia, or tics  Thought Process:  goal oriented  Associations:  no loose associations  Thought Content:  no evidence of suicidal ideation or homicidal ideation and recent AH  Insight:  fair  Judgement:  intact  Oriented to:  time, person, and place  Attention Span and Concentration:  intact  Recent and Remote Memory:  fair  Fund of Knowledge:  appropriate         Admission Diagnoses:   Alcohol withdrawal with perceptual disturbance         Assessment & Plan:     1) MSSA with Ativan.   2) Patient to be provided information on Naltrexone and Campral.   3) Patient open to  CD treatment.     Disposition Plan   Reason for ongoing admission: requires detoxification from substance that poses a risk of bodily harm during withdrawal period  Discharge location: Chemical dependency treatment facility  Discharge Medications: not ordered  Follow-up Appointments: not scheduled  Legal Status: voluntary    Video-Visit Details    Type of service:  Video Visit    Video Start Time (time video started): 0830    Video End Time (time video stopped): 0840    Originating Location (pt. Location): Other Station [facility]    Distant Location (provider location): Provider remote location    Mode of Communication:  Video Conference via [employer]    Physician has received verbal consent for a Video Visit from the patient? Yes    Entered by: Rod Adams on 12/19/2020 at 5:04 AM

## 2020-12-19 NOTE — TELEPHONE ENCOUNTER
S: [facility], 35/M, alcohol detox     Pt reports he hasn't been sober for 12 years   Pt reports .75 daily of vodka   Pt denies any hx of seizures, no DTs  Pt denies other sub use  Breath .33, walking and talking     Medically cleared, eating, drinking, ambulating indep   Patient cleared and ready for behavioral bed placement: Yes   No covid concerns, test ordered    A: Voluntary     R: 3A/Matthewuvali     900pm - Bud, on call provider, paged   902pm - Bud accepts   Pt placed in queue  903pm - unit charge Carlotta zimmerman took report, 930pm for report  908pm - ED charge notified via text page

## 2020-12-19 NOTE — PHARMACY-ADMISSION MEDICATION HISTORY
Admission Medication History Completed by Pharmacy    See [facility] Admission Navigator for allergy information, preferred outpatient pharmacy, prior to admission medications and immunization status.     Medication History Sources:     Patient, dispense report     Changes made to PTA medication list (reason):    Added:   o TUMS  o Ibuprofen    Deleted: None    Changed: None    Additional Information:    Patient was a good historian.     Patient reports taking Escitalopram for 3 weeks before discontinuing because he did not like the way it made him feel. He was also prescribed Hydroxyzine for anxiety/panic attacks but discontinued that due to the unwanted side effect of diarrhea.     Prior to Admission medications    Medication Sig Last Dose Taking? Auth Provider   calcium carbonate (TUMS) 500 MG chewable tablet Take 2 chew tab by mouth two to three times daily as needed for acid reflux Past Month at Unknown time Yes Reported, Patient   ibuprofen (ADVIL/MOTRIN) 200 MG tablet Take 400 mg by mouth daily as needed for headache/back pain Past Month at Unknown time Yes Reported, Patient   zolpidem (AMBIEN) 10 MG tablet Take 10 mg by mouth nightly as needed for sleep  12/17/2020 at Unknown time Yes Reported, Patient       Date completed: 12/19/20    Medication history completed by: Rosario Brennan

## 2020-12-19 NOTE — PLAN OF CARE
IOANA Saavedra is a 35 year old year old male with a chief complaint of alcohol problem      S = Situation:   Patient is a voluntary admission  for alcohol withdrawal  and detox      B  = Background:   Patient admitted from the ER seeking alcohol detox. Patient reports drinking 0.75-1 liter of alcohol daily for the past 12 years. Patient FABRICE in the ER 0.33 and urine drug screen positive for ethanol. Patient denies any other substance use. Patient denies any history of alcohol withdrawal seizures or DT's. This is patient's first alcohol detox admission. Patient denies any form of CD treatment and denies any inpatient mental health admissions. Patient reports he has diagnosed chronic insomnia, obstructive sleep apnea, tachycardia, anxiety, and depression.    A  =  Assessment:   Patient affect blunted, mood is anxious. Patient denies active SI, SIB, HI, or hallucinations. Patient did report he has had some recent life stressors including a break up, job change, and moving back with his parents. Patient reports he had some fleeting suicidal thoughts after the break up, but denies any at this time. Patient is also reporting having heartburn and states he has bad acid reflux every morning that he vomits. Patient MSSA score 12 during admission interview.    R =   Request or Recommendation:   Patient is on MSSA monitoring for alcohol withdrawal, with Lorazepam. Is on withdrawal precautions. Patient to be assessed in the morning by psychiatry, internal medicine, and case management.

## 2020-12-19 NOTE — PROGRESS NOTES
12/18/20 2153   Patient Belongings   Did you bring any home meds/supplements to the hospital?  No   Patient Belongings returned to patient at discharge   Belongings Search Yes   Clothing Search Yes   Second Staff Clarence BUCK & Ulises COTA   Comment see note   Storage Bin   Nothing  Medical Room Bin   [employer] medical insurance card, Passport   Security Envelope   Nothing  A             Admission:  I am responsible for any personal items that are not sent to the safe or pharmacy.  [facility] is not responsible for loss, theft or damage of any property in my possession.  Signature:  _________________________________ Date: _______  Time: _____                                              Staff Signature:  ____________________________ Date: ________  Time: _____      2nd Staff person, if patient is unable/unwilling to sign:    Signature: ________________________________ Date: ________  Time: _____   Discharge:  [facility] has returned all of my personal belongings:  Signature: _________________________________ Date: ________  Time: _____                                          Staff Signature:  ____________________________ Date: ________  Time: _____

## 2020-12-19 NOTE — PROGRESS NOTES
Pt.continues to be monitored for active alcohol withdrawal symptoms. He complained he barely slept throughout the night despite getting as needed medications. MSSA scores were 14&13. He got 2mg prn ativan X2. Denied SI/SIB/hallucinations. Will continue to monitor.

## 2020-12-19 NOTE — ED NOTES
ED to Behavioral Floor Handoff    SITUATION  Abhijit Saavedra is a 35 year old male who speaks English and lives in a home with family members The patient arrived in the ED by private car from emergency room with a complaint of Drug / Alcohol Assessment  .The patient's current symptoms started/worsened 1 week(s) ago and during this time the symptoms have increased.   In the ED, pt was diagnosed with   Final diagnoses:   Alcohol dependence with intoxication with complication (H)        Initial vitals were: BP: 134/84  Pulse: 133  Temp: 98.1  F (36.7  C)  Resp: 18  SpO2: 100 %   --------  Is the patient diabetic? No   If yes, last blood glucose? --     If yes, was this treated in the ED? --  --------  Is the patient inebriated (ETOH) Yes or Impaired on other substances? No  MSSA done? Yes  Last MSSA score: --    Were withdrawal symptoms treated? Yes  Does the patient have a seizure history? No. If yes, date of most recent seizure--  --------  Is the patient patient experiencing suicidal ideation? denies current or recent suicidal ideation     Homicidal ideation? denies current or recent homicidal ideation or behaviors.    Self-injurious behavior/urges? denies current or recent self injurious behavior or ideation.  ------  Was pt aggressive in the ED No  Was a code called No  Is the pt now cooperative? Yes  -------  Meds given in ED:   Medications   0.9% sodium chloride BOLUS (0 mLs Intravenous Stopped 12/18/20 2040)     Followed by   sodium chloride 0.9% infusion ( Intravenous New Bag 12/18/20 2110)   dextrose 5% and 0.45% NaCl 1,000 mL with Infuvite Adult 10 mL, thiamine 100 mg, folic acid 1 mg infusion ( Intravenous Stopped 12/18/20 1921)   ondansetron (ZOFRAN) injection 4 mg (4 mg Intravenous Given 12/18/20 1908)   alum & mag hydroxide-simethicone (MAALOX) suspension 30 mL (30 mLs Oral Given 12/18/20 1908)   LORazepam (ATIVAN) injection 1 mg (1 mg Intravenous Given 12/18/20 2109)      Family present during ED course?  No  Family currently present? No    BACKGROUND  Does the patient have a cognitive impairment or developmental disability? No  Allergies:   Allergies   Allergen Reactions     Penicillins Rash   .   Social demographics are   Social History     Socioeconomic History     Marital status: Single     Spouse name: None     Number of children: None     Years of education: None     Highest education level: None   Occupational History     None   Social Needs     Financial resource strain: None     Food insecurity     Worry: None     Inability: None     Transportation needs     Medical: None     Non-medical: None   Tobacco Use     Smoking status: Never Smoker     Smokeless tobacco: Never Used   Substance and Sexual Activity     Alcohol use: Yes     Alcohol/week: 10.0 standard drinks     Types: 10 Shots of liquor per week     Comment: heavily, daily      Drug use: Never     Sexual activity: None   Lifestyle     Physical activity     Days per week: None     Minutes per session: None     Stress: None   Relationships     Social connections     Talks on phone: None     Gets together: None     Attends Oriental orthodox service: None     Active member of club or organization: None     Attends meetings of clubs or organizations: None     Relationship status: None     Intimate partner violence     Fear of current or ex partner: None     Emotionally abused: None     Physically abused: None     Forced sexual activity: None   Other Topics Concern     None   Social History Narrative     None        ASSESSMENT  Labs results   Labs Ordered and Resulted from Time of ED Arrival Up to the Time of Departure from the ED   DRUG ABUSE SCREEN 6 CHEM DEP URINE ([facility]) - Abnormal; Notable for the following components:       Result Value    Ethanol Qual Urine Positive (*)     All other components within normal limits   CBC WITH PLATELETS DIFFERENTIAL - Abnormal; Notable for the following components:    MCH 34.0 (*)     All other components within normal limits    COMPREHENSIVE METABOLIC PANEL - Abnormal; Notable for the following components:    Anion Gap 17 (*)      (*)      (*)     All other components within normal limits   ALCOHOL ETHYL - Abnormal; Notable for the following components:    Ethanol g/dL 0.33 (*)     All other components within normal limits   ALCOHOL BREATH TEST POCT - Abnormal; Notable for the following components:    Alcohol Breath Test 0.314 (*)     All other components within normal limits   LIPASE   INFLUENZA A/B & SARS-COV2 PCR MULTIPLEX      Imaging Studies: No results found for this or any previous visit (from the past 24 hour(s)).   Most recent vital signs BP (!) 140/82   Pulse 144   Temp 97.1  F (36.2  C) (Oral)   Resp 18   SpO2 100%    Abnormal labs/tests/findings requiring intervention:---   Pain control: good  Nausea control: good    RECOMMENDATION  Are any infection precautions needed (MRSA, VRE, etc.)? No If yes, what infection? --  ---  Does the patient have mobility issues? independently. If yes, what device does the pt use? ---  ---  Is patient on 72 hour hold or commitment? No If on 72 hour hold, have hold and rights been given to patient? N/A  Are admitting orders written if after 10 p.m. ?N/A  Tasks needing to be completed:---     Jong Nowak RN   [facility]--    [phone] [place] [facility]   [phone] [facility]

## 2020-12-19 NOTE — CONSULTS
Brief Medicine Note    This note was made to fulfill the consult order. Please see progress note from writer for complete details.     Sunshine Levi PA-C  Hospitalist Service  Pager [phone]

## 2020-12-19 NOTE — PROGRESS NOTES
[facility] evaluation counselor met with pt to initiate discharge planning.  Pt has not had a substance use evaluation in the past. Pt He was last sober 12 years ago. He typically drinks a little before and after work. 7 months ago he tried to go sober for a week but no other periods of sobriety. 3 weeks ago he started drinking really bad. He is drinking 0.75L vodka a day.  Pt's living situation is  Living arrangements: mother and father. Pt's funding source is insurance list [place]: [id number]. Pt reports that he just went through a break up with his girlfriend and had to move back with his parents. He just started going back to AA meetings a couple days ago, and got a sponsor. 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    SOFÍA Moss

## 2020-12-20 VITALS
OXYGEN SATURATION: 98 % | SYSTOLIC BLOOD PRESSURE: 136 MMHG | RESPIRATION RATE: 16 BRPM | TEMPERATURE: 98 F | DIASTOLIC BLOOD PRESSURE: 90 MMHG | HEART RATE: 111 BPM

## 2020-12-20 PROCEDURE — 250N000013 HC RX MED GY IP 250 OP 250 PS 637: Performed by: NURSE PRACTITIONER

## 2020-12-20 PROCEDURE — 250N000013 HC RX MED GY IP 250 OP 250 PS 637: Performed by: PSYCHIATRY & NEUROLOGY

## 2020-12-20 PROCEDURE — 250N000013 HC RX MED GY IP 250 OP 250 PS 637: Performed by: PHYSICIAN ASSISTANT

## 2020-12-20 PROCEDURE — 99239 HOSP IP/OBS DSCHRG MGMT >30: CPT | Performed by: PSYCHIATRY & NEUROLOGY

## 2020-12-20 RX ORDER — MULTIPLE VITAMINS W/ MINERALS TAB 9MG-400MCG
1 TAB ORAL DAILY
Qty: 30 TABLET | Refills: 1 | Status: ON HOLD | OUTPATIENT
Start: 2020-12-21 | End: 2021-07-16

## 2020-12-20 RX ORDER — FOLIC ACID 1 MG/1
1 TABLET ORAL DAILY
Qty: 30 TABLET | Refills: 1 | Status: ON HOLD | OUTPATIENT
Start: 2020-12-21 | End: 2021-07-16

## 2020-12-20 RX ORDER — PANTOPRAZOLE SODIUM 40 MG/1
40 TABLET, DELAYED RELEASE ORAL
Qty: 30 TABLET | Refills: 1 | Status: ON HOLD | OUTPATIENT
Start: 2020-12-21 | End: 2021-07-16

## 2020-12-20 RX ORDER — LORAZEPAM 1 MG/1
1 TABLET ORAL ONCE
Status: COMPLETED | OUTPATIENT
Start: 2020-12-20 | End: 2020-12-20

## 2020-12-20 RX ADMIN — PANTOPRAZOLE SODIUM 40 MG: 40 TABLET, DELAYED RELEASE ORAL at 08:43

## 2020-12-20 RX ADMIN — MULTIPLE VITAMINS W/ MINERALS TAB 1 TABLET: TAB at 08:43

## 2020-12-20 RX ADMIN — LORAZEPAM 1 MG: 1 TABLET ORAL at 12:00

## 2020-12-20 RX ADMIN — Medication 100 MG: at 08:43

## 2020-12-20 RX ADMIN — LOPERAMIDE HYDROCHLORIDE 2 MG: 2 CAPSULE ORAL at 17:21

## 2020-12-20 RX ADMIN — FOLIC ACID 1 MG: 1 TABLET ORAL at 08:43

## 2020-12-20 ASSESSMENT — ACTIVITIES OF DAILY LIVING (ADL)
ORAL_HYGIENE: INDEPENDENT
DRESS: INDEPENDENT
HYGIENE/GROOMING: INDEPENDENT

## 2020-12-20 NOTE — PROGRESS NOTES
[facility] Evaluation Counselor met with pt and completed the Substance use evaluation. Pt signed Release of Information and [facility].  Pt's funding source is insurance list [employer]: [place].    SOFÍA Moss

## 2020-12-20 NOTE — PROGRESS NOTES
"Type Of Assessment: Comprehensive Assessment Update    Referral Source:  Self  MRN: [id number]    DATE OF SERVICE: December 20, 2020  Date of previous MADYSON Assessment: Not applicable.    PATIENT'S NAME: Abhijit Saavedra  Age: 35 year old  Last 4 SSN: [id number]  Sex: male   Gender Identity: male  Sexual Orientation: Heterosexual  Cultural Background: No, Denies any cultural influences or concerns that need to be considered for treatment  YOB: 1985  Current Address:   [address] [place] [address] [address] [facility] [address]  Patient Phone Number:  [phone]  Patient's E-Mail Contact:  [email or web address]  Funding: [facility]    Telemedicine Visit: The patient's condition can be safely assessed and treated via synchronous audio and visual telemedicine encounter.    Reason for Telemedicine Visit: Patient required immediate assessment / treatment   Originating Site (Patient Location): [address] [address] 3A Detox  Distant Site (Provider Location): [facility]: 3A Detox  Consent:  The patient/guardian has verbally consented to: the potential risks and benefits of telemedicine (video visit) versus in person care; bill my insurance or make self-payment for services provided; and responsibility for payment of non-covered services.   Mode of Communication:  Video Conference via Face to Face    START TIME: 12:50 PM  END TIME: 1:30 PM    As the provider I attest to compliance with applicable laws and regulations related to telemedicine.      Abhijit Saavedra was seen for a substance use disorder consult on 12/20/2020 by SOFÍA Moss.     Reason for Substance Use Disorder Consult:  Per patient \"Need profesional help after 10 plus years of drinking.' Abhijit Saavedra is a 35 year old male who presents to the ED today seeking alcohol detox. He was last sober 12 years ago. He typically drinks a little before and after work. 7 months ago he " tried to go sober for a week but no other periods of sobriety. 3 weeks ago he started drinking really bad. He is drinking 0.75L vodka a day.   The patient reports his last drink at 10 am. He denies h/o withdrawal seizure. He notes having had a spell but was awake and alert the whole time.  He has never done detox before. He is interested in treatment after detox. As for medical history he notes having had a rapid heart rate. He has not gone to his primary care doctor for this. No history of hypertension. Denies fever, cough, shortness of air, abdominal pain, dysuria, hematuria, rash, vomiting or diarrhea. No numbness, weakness, tingling. No suicidal or homicidal ideation.       Are you currently having severe withdrawal symptoms that are putting yourself or others in danger? No  Are you currently having severe medical problems that require immediate attention? No  Are you currently having severe emotional or behavioral problems that are putting yourself or others at risk of harm? No    Have you participated in prior substance use disorder evaluations? No   Have you ever been to detox, inpatient or outpatient treatment for substance related use? List previous treatment: No   Have you ever had a gambling problem or had treatment for compulsive gambling? No  Patient does not appear to be in severe withdrawal, an imminent safety risk to self or others, or requiring immediate medical attention and may proceed with the assessment interview.    Comprehensive Substance Use History   X X = Primary Drug Used Age of First Use    Pattern of Substance Use   (heaviest use in life and a use history within the past year if applicable) (DSM-5: Sx #3) Date /  Quantity of last use if within the past 30 days Withdrawal Potential?   Method of use  (Oral, smoked, snorted, IV, etc)    Alcohol   22 Age 21-23- Drinking pretty hard in college: 30 drinks per week.  Age 24-32-Moderate use:15-20 drinks a week.  Heavy drinking in the past 6  months: drinking 50 drinks per week 12/18/20 at 11:00 AM yes oral    Marijuana/Hashish   26 One or 2 times a year A year ago no smoked    Cocaine/Crack 30 One time. Thought dumb 5 years ago no snorted    Meth/Amphetamines   No use        Heroin   No use        Other Opiates/Synthetics   No use        Inhalants  No use        Benzodiazepines   No use        Hallucinogens   No use        Barbiturates/Sedatives/Hypnotics   No use        Over-the-Counter Drugs   No use        Other   No use        Nicotine   No use         Withdrawal symptoms: Have you had any of the following withdrawal symptoms?  Sweating (Rapid Pulse)  Shaky / Jittery / Tremors  Unable to Sleep  Agitation  Headache  Fatigue / Extremely Tired  Sad / Depressed Feeling  Nausea / Vomiting  Dizziness  Diarrhea  Diminished Appetite  Anxiety / Worried    Have you experienced any cravings?  Yes, Alcohol only  Have you had periods of abstinence?  Yes  What was your longest period? For one month.    Any circumstances that lead to relapse? Couldn't take peers pressure plus hd a party.    What activities have you engaged in when using alcohol/other drugs that could be hazardous to you or others? (i.e. driving a car/motorcycle/boat, operating machinery, unsafe sex, sharing needles for drugs or tattoos, etc ) The patient reported having a history of driving while under the influence of alcohol or drugs and 4 wheeling, driving to excess.    A description of any risk-taking behavior, including behavior that puts the client at risk of exposure to blood-borne or sexually transmitted diseases: Patient denies.    Arrests and legal interventions related to substance use: Patient denies.    A description of how the patient's use affected the their ability to function appropriately in a work setting: Fired fro drinking while driving in college.    A description of how the patient's use affected the their ability to function appropriately in an educational setting: Patient  denies.    Leisure time activities that are associated with substance use: Golf.    Do you think your substance use has become a problem for you? He agrees he has a substance abuse problem.    MEDICAL HISTORY    Physical or medical concerns or diagnoses: Overweight, alcoholism and sleep apnea.    Do you have any current medical treatment needs not being addressed by inpatient treatment?  Yes.    Do you need a referral for a medical provider? No.    Current medications:   Patient reports current meds as:   Outpatient Medications Marked as Taking for the 12/18/20 encounter (Hospital Encounter)   Medication Sig     calcium carbonate (TUMS) 500 MG chewable tablet Take 2 chew tab by mouth two to three times daily as needed for acid reflux     [START ON 12/21/2020] folic acid (FOLVITE) 1 MG tablet Take 1 tablet (1 mg) by mouth daily     ibuprofen (ADVIL/MOTRIN) 200 MG tablet Take 400 mg by mouth daily as needed for headache/back pain     [START ON 12/21/2020] multivitamin w/minerals (THERA-VIT-M) tablet Take 1 tablet by mouth daily     [START ON 12/21/2020] pantoprazole (PROTONIX) 40 MG EC tablet Take 1 tablet (40 mg) by mouth every morning (before breakfast)         Are you pregnant? NA, Male    Do you have any specific physical needs/accommodations? No    MENTAL HEALTH HISTORY:    Have you ever had  hospitalizations or treatment for mental health illness: No    Mental health history, including diagnosis and symptoms, and the effect on the client's ability to function: Patient denies.    Current mental health treatment including psychotropic medication needed to maintain stability: (Note: The assessment must utilize screening tools approved by the commissioner pursuant to section 245.4863 to identify whether the client screens positive for co-occurring disorders): None reported.    GAIN-SS Tool:    When was the last time that you had significant problems     with feeling very trapped, lonely, sad, blue, depressed or  hopeless about the future? Past Month  with sleep trouble, such as bad dreams, sleeping restlessly, or falling asleep during the day? Past Month  with feeling very anxious, nervous, tense, scared, panicked or like something bad was going to happen?  2 - 12 months ago  with becoming very distressed and upset when something reminded you of the past?  2 - 12 months ago  with thinking about ending your life or committing suicide?  Never    When was the last time that you did the following things two or more times?    Lied or conned to get things you wanted or to avoid having to do something?   Past Month  Had a hard time paying attention at school, work or home? Past Month  Had a hard time listening to instructions at school, work or home?  2 - 12 months ago  Were a bully or threatened other people?  1+ years ago  Started physical fights with other people?  Never    Have you ever been verbally, emotionally, physically or sexually abused?   No    Family history of substance use and misuse: My entire family drinks at events, typical [place] family. I've just became the worse drinker.    The patient's desire for family involvement in the treatment program:  Yes I do.    Level of family support: Parents are very supportive.    Social network in relation to expected support for recovery: Ex-girlfriend supports decision, it's one of the reason we broke up. Parents positive support decision to stay sober.    Are you currently in a significant relationship? No    Do you have any children (include living arrangements/custody/contact)?:  No.    What is your current living situation? Living with parents.    Are you employed/attending school? Employed as a . College graduate.    SUMMARY:    Ability to understand written treatment materials: Yes  Ability to understand patient rules and patient rights: Yes  Does the patient recognize needs related to substance use and is willing to follow treatment recommendations:  Yes  Does the patient have an opioid use disorder:  does not have a history of opiate use.    ASAM Dimension Scale Ratings:    Dimension 1: 0 Client displays full functioning with good ability to tolerate and cope with withdrawal discomfort. No signs or symptoms of intoxication or withdrawal or resolving signs or symptoms.  Dimension 2: 1 Client tolerates and frida with physical discomfort and is able to get the services that the client needs.  Dimension 3: 1 Client has impulse control and coping skills. Client presents a mild to moderate risk of harm to self or others or displays symptoms of emotional, behavioral or cognitive problems. Client has a mental health diagnosis and is stable. Client functions adequately in significant life areas.  Dimension 4: 1 Client is motivated with active reinforcement, to explore treatment and strategies for change, but ambivalent about illness or need for change.  Dimension 5: 4 No awareness of the negative impact of mental health problems or substance abuse. No coping skills to arrest mental health or addiction illnesses, or prevent relapse.  Dimension 6: 3 Client is not engaged in structured, meaningful activity and the client's peers, family, significant other, and living environment are unsupportive, or there is significant criminal justice system involvement.    Category of Substance Severity (ICD-10 Code / DSM 5 Code)     Alcohol Use Disorder Severe  (10.20) (303.90)   Cannabis Use Disorder The patient does not meet the criteria for a Cannabis use disorder.   Hallucinogen Use Disorder The patient does not meet the criteria for a Hallucinogen use disorder.   Inhalant Use Disorder The patient does not meet the criteria for an Inhalant use disorder.   Opioid Use Disorder The patient does not meet the criteria for an Opioid use disorder.   Sedative, Hypnotic, or Anxiolytic Use Disorder The patient does not meet the criteria for a Sedative/Hypnotic use disorder.   Stimulant  Related Disorder The patient does not meet the criteria for a Stimulant use disorder.   Tobacco Use Disorder The patient does not meet the criteria for a Tobacco use disorder.   Other (or unknown) Substance Use Disorder The patient does not meet the criteria for a Other (or unknown) Substance use disorder.     A problematic pattern of alcohol/drug use leading to clinically significant impairment or distress, as manifested by at least two of the following, occurring within a 12-month period:    1.) Alcohol/drug is often taken in larger amounts or over a longer period than was intended.  2.) There is a persistent desire or unsuccessful efforts to cut down or control alcohol/drug use  3.) A great deal of time is spent in activities necessary to obtain alcohol, use alcohol, or recover from its effects.  4.) Craving, or a strong desire or urge to use alcohol/drug  5.) Recurrent alcohol/drug use resulting in a failure to fulfill major role obligations at work, school or home.  6.) Continued alcohol use despite having persistent or recurrent social or interpersonal problems caused or exacerbated by the effects of alcohol/drug.  7.) Important social, occupational, or recreational activities are given up or reduced because of alcohol/drug use.  9.) Alcohol/drug use is continued despite knowledge of having a persistent or recurrent physical or psychological problem that is likely to have been caused or exacerbated by alcohol.  10.) Tolerance, as defined by either of the following: A need for markedly increased amounts of alcohol/drug to achieve intoxication or desired effect.  11.) Withdrawal, as manifested by either of the following: The characteristic withdrawal syndrome for alcohol/drug (refer to Criteria A and B of the criteria set for alcohol/drug withdrawal).    Specify if: In early remission:  After full criteria for alcohol/drug use disorder were previously met, none of the criteria for alcohol/drug use disorder have  been met for at least 3 months but for less than 12 months (with the exception that Criterion A4,  Craving or a strong desire or urge to use alcohol/drug  may be met).     In sustained remission:   After full criteria for alcohol use disorder were previously met, non of the criteria for alcohol/drug use disorder have been met at any time during a period of 12 months or longer (with the exception that Criterion A4,  Craving or strong desire or urge to use alcohol/drug  may be met).     Specify if:   This additional specifier is used if the individual is in an environment where access to alcohol is restricted.    Mild: Presence of 2-3 symptoms  Moderate: Presence of 4-5 symptoms  Severe: Presence of 6 or more symptoms    Collateral information: MADYSON Collateral Info: MADYSON Collateral Info: Sufficient information is obtained from the patient to support diagnosis and recommendations.     Collateral Contacts     Name:    Jakob Simpson MD   Relationship:    [facility]'s physician   Phone Number:    NA Releases:    Clarissa Saavedra is a 35 year old male who presents to the ED today seeking alcohol detox. He was last sober 12 years ago. He typically drinks a little before and after work. 7 months ago he tried to go sober for a week but no other periods of sobriety. 3 weeks ago he started drinking really bad. He is drinking 0.75L vodka a day.   The patient reports his last drink at 10 am. He denies h/o withdrawal seizure. He notes having had a spell but was awake and alert the whole time.  He has never done detox before. He is interested in treatment after detox. As for medical history he notes having had a rapid heart rate. He has not gone to his primary care doctor for this. No history of hypertension. Denies fever, cough, shortness of air, abdominal pain, dysuria, hematuria, rash, vomiting or diarrhea. No numbness, weakness, tingling. No suicidal or homicidal ideation.         Collateral Contacts  "    Name:    Rod Adams MD   Relationship:    [facility]'s physician   Phone Number:    NA   Releases:    No     [facility], [place]     Psychiatric History and Physical    Admission date: 12/18/2020        Chief Complaint:     \"Alcohol\"        HPI:     The patient is a 36yo male with a history of alcohol use disorder and depression who was admitted after drinking up to one liter of hard alcohol daily. BAL was 0.33 on admission. LFTs are elevated. Says that he is \"not doing that well.\" Didn't sleep \"at all.\" Feels that he has poor coordination today and some nausea. Denies any withdrawal seizures. Feels that he has been hearing \"weird noises\" and his name being called. Feeling guilty about his relapse. Is open to CD treatment and anti-craving medications. Denies SI.       Per ER:    Abhijit Saavedra is a 35 year old male who presents to the ED today seeking alcohol detox. He was last sober 12 years ago. He typically drinks a little before and after work. 7 months ago he tried to go sober for a week but no other periods of sobriety. 3 weeks ago he started drinking really bad. He is drinking 0.75L vodka a day.   The patient reports his last drink at 10 am. He denies h/o withdrawal seizure. He notes having had a spell but was awake and alert the whole time.  He has never done detox before. He is interested in treatment after detox. As for medical history he notes having had a rapid heart rate. He has not gone to his primary care doctor for this. No history of hypertension. Denies fever, cough, shortness of air, abdominal pain, dysuria, hematuria, rash, vomiting or diarrhea. No numbness, weakness, tingling. No suicidal or homicidal ideation.             Past Psychiatric History:     No psychiatric hospitalizations.     No history of suicide attempts.     Says that he was on antidepressants recently \"but I didn't like them.\"       Substance Use and History:     Alcohol use disorder. Denies " "a history of withdrawal seizures.      Recent Ambien use.     Non smoker.         Past Medical History:     PAST MEDICAL HISTORY: History reviewed. No pertinent past medical history.    PAST SURGICAL HISTORY: History reviewed. No pertinent surgical history.         Family History:     FAMILY HISTORY: \"everyone drinks in my family.\"         Social History:   Please see the full psychosocial profile from the clinical treatment coordinator.     SOCIAL HISTORY:     Social History   Tobacco Use     Smoking status:     Never Smoker       Smokeless tobacco:     Never Used     Substance Use Topics       Alcohol use:     Yes     Alcohol/week:     10.0 standard drinks     Types:     10 Shots of liquor per week     Comment: heavily, daily         Physical ROS:       The patient endorsed nausea. The remainder of 10-point review of systems was negative except as noted in HPI.         PTA Medications:          Medications Prior to Admission     Medication     Sig     Dispense     Refill     Last Dose       zolpidem (AMBIEN) 10 MG tablet     Take 10 mg by mouth nightly as needed for sleep Pt states that he has been taking 20mg the last two days       12/17/2020 at Unknown time          Allergies:     Allergies     Allergen     Reactions       Penicillins     Rash          Labs:     Recent Results (from the past 48 hour(s))     Alcohol breath test POCT     Collection Time: 12/18/20  5:05 PM     Result     Value     Ref Range     Alcohol Breath Test     0.314 (A)     0.00 - 0.01     CBC with platelets differential     Collection Time: 12/18/20  6:20 PM     Result     Value     Ref Range     WBC     8.8     4.0 - 11.0 10e9/L      RBC Count     4.56     4.4 - 5.9 10e12/L     Hemoglobin     15.5     13.3 - 17.7 g/dL     Hematocrit     45.8     40.0 - 53.0 %     MCV     100     78 - 100 fl     MCH     34.0 (H)     26.5 - 33.0 pg       MCHC     33.8     31.5 - 36.5 g/dL     RDW     12.7     10.0 - 15.0 %      Platelet Count     255 "     150 - 450 10e9/L       Diff Method     Automated Method   % Neutrophils     72.1     %     % Lymphocytes     20.0     %     % Monocytes     6.5     %      % Eosinophils     0.3     %     % Basophils     0.9     %   % Immature Granulocytes     0.2     %     Nucleated RBCs     0     0 /100     Absolute Neutrophil     6.4     1.6 - 8.3 10e9/L     Absolute Lymphocytes     1.8     0.8 - 5.3 10e9/L     Absolute Monocytes     0.6     0.0 - 1.3 10e9/L     Absolute Eosinophils     0.0     0.0 - 0.7 10e9/L     Absolute Basophils     0.1     0.0 - 0.2 10e9/L     Abs Immature Granulocytes     0.0     0 - 0.4 10e9/L      Absolute Nucleated RBC     0.0     Comprehensive metabolic panel     Collection Time: 12/18/20  6:20 PM     Result     Value     Ref Range     Sodium     138     133 - 144 mmol/L     Potassium     3.9     3.4 - 5.3 mmol/L      Chloride     101     94 - 109 mmol/L     Carbon Dioxide     20     20 - 32 mmol/L     Anion Gap     17 (H)     3 - 14 mmol/L     Glucose     85     70 - 99 mg/dL     Urea Nitrogen     13     7 - 30 mg/dL     Creatinine     0.86     0.66 - 1.25 mg/dL       GFR Estimate     >90     >60 mL/min/[1.73_m2]     GFR Estimate If Black     >90     >60 mL/min/[1.73_m2]       Calcium     9.1     8.5 - 10.1 mg/dL     Bilirubin Total     0.7     0.2 - 1.3 mg/dL       Albumin     4.4     3.4 - 5.0 g/dL     Protein Total     8.4     6.8 - 8.8 g/dL     Alkaline Phosphatase     128     40 - 150 U/L       ALT     367 (H)     0 - 70 U/L     AST     346 (H)     0 - 45 U/L     Lipase         Collection Time: 12/18/20  6:20 PM     Result     Value     Ref Range     Lipase     156     73 - 393 U/L     Alcohol ethyl     Collection Time: 12/18/20  6:20 PM     Result     Value     Ref Range     Ethanol g/dL     0.33 (HH)     <0.01 g/dL       Lipid Profile     Collection Time: 12/18/20  6:20 PM     Result     Value     Ref Range     Cholesterol     295 (H)     <200 mg/dL      Triglycerides     115     <150 mg/dL      HDL Cholesterol     102     >39 mg/dL     LDL Cholesterol Calculated     170 (H)     <100 mg/dL     Non HDL Cholesterol     193 (H)     <130 mg/dL       GGT     Collection Time: 12/18/20  6:20 PM     Result     Value     Ref Range       GGT     614 (H)     0 - 75 U/L     TSH with free T4 reflex     Collection Time: 12/18/20  6:20 PM       Result     Value     Ref Range       TSH     0.48     0.40 - 4.00 mU/L     Drug abuse screen 6 urine (tox)     Collection Time: 12/18/20  6:34 PM     Result     Value     Ref Range     Amphetamine Qual Urine     Negative     NEG^Negative             Barbiturates Qual Urine     Negative     NEG^Negative     Benzodiazepine Qual Urine     Negative     NEG^Negative     Cannabinoids Qual Urine     Negative     NEG^Negative     Cocaine Qual Urine     Negative     NEG^Negative     Ethanol Qual Urine     Positive (A)     NEG^Negative     Opiates Qualitative Urine     Negative     NEG^Negative       EKG 12-lead, tracing only     Collection Time: 12/18/20  6:56 PM     Result     Value     Ref Range     Interpretation ECG     Click View Image link to view waveform and result       Asymptomatic Influenza A/B & SARS-CoV2 (COVID-19) Virus PCR Multiplex       Collection Time: 12/18/20  7:14 PM       Specimen: Nasopharyngeal       Result     Value     Ref Range     Flu A/B & SARS-COV-2 PCR Source     Nasopharyngeal     SARS-CoV-2 PCR Result     NEGATIVE       Influenza A PCR     Negative     NEG^Negative     Influenza B PCR     Negative     NEG^Negative     Respiratory Syncytial Virus PCR     Negative     NEG^Negative       Flu A/B & SARS-CoV-2 PCR Comment     (Note)          Physical and Psychiatric Examination:        BP (!) 138/95   Pulse 144   Temp 98.2  F (36.8  C) (Temporal)   Resp 16   SpO2 94%     Weight is 0 lbs 0 oz  There is no height or weight on file to calculate BMI.         Physical Exam:    I have reviewed the physical exam as documented by by the medical team and agree  with findings and assessment and have no additional findings to add at this time.      Mental Status Exam:    Appearance: awake, alert and adequately groomed    Attitude:  cooperative    Eye Contact:  good    Mood:  anxious and depressed    Affect:  mood congruent    Speech:  clear, coherent    Language: fluent and intact in English    Psychomotor, Gait, Musculoskeletal:  no evidence of tardive dyskinesia, dystonia, or tics    Thought Process:  goal oriented    Associations:  no loose associations    Thought Content:  no evidence of suicidal ideation or homicidal ideation and recent AH    Insight:  fair    Judgement:  intact    Oriented to:  time, person, and place    Attention Span and Concentration:  intact    Recent and Remote Memory:  fair    Fund of Knowledge:  appropriate                Admission Diagnoses:       Alcohol withdrawal with perceptual disturbance                Assessment & Plan:            1) MSSA with Ativan.     2) Patient to be provided information on Naltrexone and Campral.     3) Patient open to CD treatment.          Disposition Plan     Reason for ongoing admission: requires detoxification from substance that poses a risk of bodily harm during withdrawal period    Discharge location: Chemical dependency treatment facility    Discharge Medications: not ordered    Follow-up Appointments: not scheduled    Legal Status: voluntary         Video-Visit Details         Type of service:  Video Visit         Video Start Time (time video started): 0830         Video End Time (time video stopped): 0840      Originating Location (pt. Location): Other Station [facility]         Distant Location (provider location): Provider remote location         Mode of Communication:  Video Conference via [employer]         Physician has received verbal consent for a Video Visit from the patient? Yes         Entered by: Rod Adams on 12/19/2020 at 5:04 AM         Recommendations:   1)  Complete a residential based or  similar treatment program, such as [facility] or [facility] [place].   2)  Abstain from all mood-altering chemicals unless prescribed by a licensed provider.   3)  Attend, at minimum, 2 weekly support group meetings, such as 12 step based (AA/NA), SMART Recovery, Health Realizations, and/or Refuge Recovery meetings.     4)  Actively work with a male mentor/sponsor on a weekly basis.      Abhijit Saavedra will need to apply for financial eligibility through their county of residence. Please provide the County contact for eligibility and application process through [facility] PHONE NUMBER(S):  [facility]     [phone].    Resources:   MADYSON Resources: Achisra Anonymous - [place]   (https://aam[place].org)      MADYSON consult completed by: SOFÍA Moss.  Phone Number: [phone]  E-mail Address: [email or web address] Mental Health and Addiction Services Evaluation Department  [address] [place] [id number]

## 2020-12-20 NOTE — PLAN OF CARE
"Abhijit participated in Group Songwriting Intervention today.  Music Therapist shared an original song (3 verses) with the group and asked the group to help write a Chorus for the song.  The song was called \"Welcome Home\" with themes of belonging and imperfection/acceptance of self.  Goals of the session were to provide a creative experience that fostered autonomy and self-determination within a positive social environment.  Patients were encouraged to contribute their song phrases and ideas for the chorus, and praised for doing so.  Then, Music Therapist combined the elements the group had created to make a composite song, with group feedback and direction on sofía, chords and song structure.  Abhijit was a positive, contributing participant.  At the end of group he said to therapist \"At first I didn't like you-but this was really good! I'm really glad I stayed!\"        "

## 2020-12-20 NOTE — PLAN OF CARE
"Patient slept for 6.75 hours.    MSSA=4; denied withdrawal symptoms. \"I feel great.\"    No concerns voiced.  "

## 2020-12-20 NOTE — PROGRESS NOTES
Brief Medicine Note    Medicine following for BP monitoring. BP improved today - currently 109/70.    No further medical intervention is required at this time. Medicine signing off. Please feel free to call with any questions.     Sunshine Levi PA-C  Hospitalist Service  Pager [phone]

## 2020-12-20 NOTE — PLAN OF CARE
Patient visible in the milieu, social with peers. Affect full range, mood is anxious. Patient denies SI, SIB, HI, or hallucinations. Patient MSSA scores for alcohol withdrawal were 8 and 6. Patient was given 1 mg of lorazepam. Patient reported some heartburn, was given prn maalox. Was also given prn trazodone 50 mg and prn hydroxyzine 25 mg at HS for sleep and anxiety. Patient reported to writer that if he could discharge tomorrow, if he is no longer in acute withdrawal, he would like to. Writer informed patient that he will need to request discharge from the on-call physician tomorrow. Patient denies any additional concerns.

## 2020-12-20 NOTE — PLAN OF CARE
Patient stated he does want to discharge home. Patient reported he would sleep better at home and plans to go to CD treatment. Patient denies SI, SIB, HI, or hallucinations. Patient is no longer in alcohol withdrawal. Discharge instructions, medications, and lab results were reviewed with patient. Patient questions answered. Patient verbalizes understanding of discharge instructions.

## 2020-12-20 NOTE — PROGRESS NOTES
Patient has not scored greater than an 8 on MSSA monitoring for alcohol withdrawal in over 24 hours and has not required any lorazepam for over 24 hours. Patient is not longer in acute alcohol withdrawal.

## 2020-12-20 NOTE — DISCHARGE SUMMARY
"Psychiatric Discharge Summary    Abhijit Saavedra MRN# [id number]   Age: 35 year old YOB: 1985     Date of Admission:  12/18/2020  Date of Discharge:  12/20/2020  5:55 PM  Admitting Physician:  Rod Adams MD   Discharge Physician:  Rod Adams MD          Event Leading to Hospitalization:   The patient is a 36yo male with a history of alcohol use disorder and depression who was admitted after drinking up to one liter of hard alcohol daily. BAL was 0.33 on admission. LFTs are elevated. Says that he is \"not doing that well.\" Didn't sleep \"at all.\" Feels that he has poor coordination today and some nausea. Denies any withdrawal seizures. Feels that he has been hearing \"weird noises\" and his name being called. Feeling guilty about his relapse. Is open to CD treatment and anti-craving medications. Denies SI.         See Admission note by Rita Carter MD for additional details.          Diagnoses:     Alcohol withdrawal with perceptual disturbance         Labs:        Lab Results   Component Value Date     12/19/2020    Lab Results   Component Value Date    CHLORIDE 101 12/19/2020    Lab Results   Component Value Date    BUN 9 12/19/2020      Lab Results   Component Value Date    POTASSIUM 3.8 12/19/2020    Lab Results   Component Value Date    CO2 29 12/19/2020    Lab Results   Component Value Date    CR 0.83 12/19/2020          Lab Results   Component Value Date    WBC 8.8 12/18/2020    HGB 15.5 12/18/2020    HCT 45.8 12/18/2020     12/18/2020     12/18/2020     Lab Results   Component Value Date     (H) 12/19/2020     (H) 12/19/2020     (H) 12/18/2020    ALKPHOS 109 12/19/2020    BILITOTAL 0.9 12/19/2020     Lab Results   Component Value Date    TSH 0.48 12/18/2020            Consults:   Consultation during this admission received from internal medicine:    Abhijit Saavedra is a 35 year old with a history of alcohol use disorder and mild CLAUDETTE " who is admitted to station 3A with alcohol withdrawal. Internal Medicine consultation was ordered by Dr. Carter for comanagement of alcohol withdrawal.         # Alcohol use disorder with acute withdrawal:  Has been drinking heavily for the past 1 month, drinking about 7-10 shots per day. Has had a history of alcohol dependence for ~12 years. Last drink was PTA. ABT 0.33 on admission. Utox positive for ethanol. Denies hx of CIPRIANO or DTs.   -Management per psychiatry team.   -Agree with MVI, folic acid and thiamine supplementation     # Mild CLAUDETTE:   Follows with sleep medicine. Had recent sleep study with mild CLAUDETTE. Was recommended to start on CPAP - he has yet to pick this up.   - Follow up with sleep medicine provider as outpatient to start CPAP for CLAUDETTE.     # AGMA, resolved:  AG 17, CO2 20 on admission. Glucose WNL. Likely in setting of alcohol ketosis/dehydration. S/p IVF in the ED. Repeat CMP today with improved AG to 5.   - Encourage fluid intake      # Transaminitis:  , , AlkP and T bili WNL. . CMP today with improved ALT to 268 and . Denies hx of IVDU, tylenol use or OTC herbal supplements. No abdominal pain, N/V. Likely in setting of alcohol use.   - Given LFT improvement today- no need for further LFT trends while in patient  - Recommend follow up with PCP within 7 days for repeat CMP to monitor LFTs to ensure resolution  - Please notify medicine with any new abdominal pain, N/V or fever >100.4F      # Abnormal Lipid Panel:  , , Non HDL cholesterol 193, Triglycerides 115.   - Follow up w/ PCP for repeat fasting lipid panel  - Lifestyle modifications discussed, alcohol cessation      # Elevated blood pressure in setting of alcohol withdrawal:  /94. Likely elevated in setting of alcohol withdrawal. Denies hx of HTN. Not on antihypertensives in the past.  - Monitor, please notify medicine if SBP >140 or DBP >90 if out of withdrawal period or SBP >175 or DBP >110       # Acid reflux  # Dark stools, resolved:  Patient reports episodic acid reflux. He also reports a history of dark/black colored stools ~2 weeks ago that was isolated event without recurrence. Denies current hematemesis, melena or hematochezia. Denies endoscopic procedures. No epigastric pain or ttp. Hgb stable at 15.5.   - Start Pantoprazole 40mg daily  - Follow up with PCP and referral to GI given upon discharge for follow-up          Hospital Course:   Abhijit Saavedra was admitted to Station 3A with attending Rod Adams MD  as a voluntary patient. The patient was placed under status 15 (15 minute checks) to ensure patient safety.   MSSA protocol was initiated due to the patient's history of alcohol abuse and concern for withdrawal symptoms.  CBC, BMP and utox obtained.    All outpatient medications were continued    Abhijit Saavedra did participate in groups and was visible in the milieu.     The patient's symptoms of withdrawal improved.     Abhijit Saavedra was released to parents' home and then CD treatment. At the time of discharge Abhijit Saavedra was determined to not be a danger to himself or others. At the current time of discharge, the patient does not meet criteria for involuntary hospitalization. On the day of discharge, the patient reports that they do not have suicidal or homicidal ideation and would never hurt themselves or others. Steps taken to minimize risk include: assessing patient s behavior and thought process daily during hospital stay, discharging patient with adequate plan for follow up for mental and physical health and discussing safety plan of returning to the hospital should the patient ever have thoughts of harming themselves or others. Therefore, based on all available evidence including the factors cited above, the patient does not appear to be at imminent risk for self-harm, and is appropriate for outpatient level of care.           Discharge Medications:     Current  Discharge Medication List      START taking these medications    Details   folic acid (FOLVITE) 1 MG tablet Take 1 tablet (1 mg) by mouth daily  Qty: 30 tablet, Refills: 1    Associated Diagnoses: Alcohol dependence with intoxication with complication (H)      multivitamin w/minerals (THERA-VIT-M) tablet Take 1 tablet by mouth daily  Qty: 30 tablet, Refills: 1    Associated Diagnoses: Alcohol dependence with intoxication with complication (H)      pantoprazole (PROTONIX) 40 MG EC tablet Take 1 tablet (40 mg) by mouth every morning (before breakfast)  Qty: 30 tablet, Refills: 1    Associated Diagnoses: Gastroesophageal reflux disease, unspecified whether esophagitis present         CONTINUE these medications which have NOT CHANGED    Details   calcium carbonate (TUMS) 500 MG chewable tablet Take 2 chew tab by mouth two to three times daily as needed for acid reflux      ibuprofen (ADVIL/MOTRIN) 200 MG tablet Take 400 mg by mouth daily as needed for headache/back pain         STOP taking these medications       zolpidem (AMBIEN) 10 MG tablet Comments:   Reason for Stopping:                    Psychiatric Examination:   The patient was not seen on the day of discharge.           Discharge Plan:   Continue medications as above.     Major Treatments, Procedures and Findings:  You have withdrawn from alcohol using the MSS protocol.  You have met with a  to develop a treatment plan for discharge.  You have had labs drawn and those results have been reviewed with you. Please take a copy of your lab work with you to your next primary care physician appointment.     Primary Provider:  [facility]    [address] [place] [address]    [phone]   Freddie Christie,   Follow Up and recommended labs and tests    Follow up with your primary care provider within 7 days for hospital follow-up. Recommend repeat CBC and CMP at that time. Recommend repeat fasting  lipid panel.   Follow up with your sleep medicine provider for your mild CLAUDETTE and to start your CPAP therapy.       [facility] [place]:  Address: [address], [place] [address]  Phone: [phone]        Symptoms to Report:  If you experience more anxiety, confusion, sleeplessness, deep sadness or thoughts of suicide, notify your treatment team or notify your primary care physician. IF ANY OF THE SYMPTOMS YOU ARE EXPERIENCING ARE A MEDICAL EMERGENCY CALL 911 IMMEDIATELY.      Lifestyle Adjustment: Adjust your lifestyle to get enough sleep, relaxation, exercise and  good nutrition. Continue to develop healthy coping skills to decrease stress and promote a sober living environment. Do not use alcohol, illegal drugs or addictive medications other than what is currently prescribed. AA, NA, and  Sponsor are excellent resources for support.      General Medication Instructions:   See your medication sheet(s) for instructions.   Take all medicines as directed.  Make no changes unless your doctor suggests them.   Do not use any drugs not prescribed by your provider.    Avoid alcohol.      Attestation:    I spent more than 30 minutes on discharge day activities. Rod Adams MD

## 2020-12-20 NOTE — PLAN OF CARE
"Pt continues to be monitored for alcohol withdrawal. MSSA this shift = 5 and 5. It's  anticipated he will be \"out of detox\" after the 4 PM check. This morning he was asking to be discharged after his detox was complete. However, after he spoke with the  and his parents, he is reconsidering his decision. He is thinking it may be better for him  to wait until his discharge plan for treatment is more formalized.   He denies SI/SIB/HI. He is pleasant and cooperative.   /70   Pulse 106   Temp 97.6  F (36.4  C) (Temporal)   Resp 16   SpO2 95%     "

## 2020-12-20 NOTE — DISCHARGE INSTRUCTIONS
Behavioral Discharge Planning and Instructions  THANK YOU FOR CHOOSING THE [facility]  3A  [phone]    Summary: You were admitted to Station 3A on 12/18/2020 for detoxification from alcohol.  A medical exam was performed that included lab work. You have met with a  and opted to discharge home.  Please take care and make your recovery a priority!    Main Diagnosis: Alcohol use disorder    Recommendation:  Patient has completed a Rule 25 assessment and has one referral for residential CD treatment. Patient has a referral out for [facility] and patient to follow up outpatient.    Disposition: home      Major Treatments, Procedures and Findings:  You have withdrawn from alcohol using the [facility] protocol.  You have met with a  to develop a treatment plan for discharge.  You have had labs drawn and those results have been reviewed with you. Please take a copy of your lab work with you to your next primary care physician appointment.    Primary Provider:  [facility]    [address] [place] [address]    [phone]   Freddie Christie,   Follow Up and recommended labs and tests    Follow up with your primary care provider within 7 days for hospital follow-up. Recommend repeat CBC and CMP at that time. Recommend repeat fasting lipid panel.   Follow up with your sleep medicine provider for your mild CLAUDETTE and to start your CPAP therapy.      [facility] [place]:  Address: [address], [place] [address]  Phone: [phone]      Symptoms to Report:  If you experience more anxiety, confusion, sleeplessness, deep sadness or thoughts of suicide, notify your treatment team or notify your primary care physician. IF ANY OF THE SYMPTOMS YOU ARE EXPERIENCING ARE A MEDICAL EMERGENCY CALL 911 IMMEDIATELY.     Lifestyle Adjustment: Adjust your lifestyle to get enough sleep, relaxation, exercise and  good nutrition.  "Continue to develop healthy coping skills to decrease stress and promote a sober living environment. Do not use alcohol, illegal drugs or addictive medications other than what is currently prescribed. AA, NA, and  Sponsor are excellent resources for support.     General Medication Instructions:   See your medication sheet(s) for instructions.   Take all medicines as directed.  Make no changes unless your doctor suggests them.   Do not use any drugs not prescribed by your provider.    Avoid alcohol.    DISCHARGE RESOURCES:    Facts about COVID19 at www.cdc.gov/COVID19 and www.[place].gov/covid19    Keeping hands clean is one of the most important steps we can take to avoid getting sick and spreading germs to others.  Please wash your hands frequently and lather with soap for at least 20 seconds!      Resources:   Resources for on line recovery meetings:  *due to covid-19 AA/NA meetings are being held online*  AA meetings can be found online; search for them at: http://aa-intergroup.org/directory.php  AA meetings via [place] for [place] area can be found online at: https://a[place].org/find-a-meeting/holiday-closings/  NA meetings via [place]OM for [place] area can be found online at: https://sites.[employer].com/view/[place]regionofnarcoticsanonymous/home?authuser=2  Www.eÃ‡ift  has online resources for meeting and recovery care including Podcast \"Let's Talk:Addiction & Recovery Podcasts    Www.[employer].org     -SMART Recovery - self management for addiction recovery:  www.smartrecJB Therapeuticsy.org    -Pathways ~ A Health Crisis Resource & Support Center: 227.626.9886.  -[facility] 313-704-6866   -[facility] Behavioral Intake 769-531-7900 or 742-197-8808.  -Suicide Awareness Voices of Education (SAVE) (www.save.org): 988-358-URJH (2145)  -National Suicide Prevention Line (www.mentalhealthmn.org): 754-489-TZII (7094)  -National [place] on Mental Illness (www.[place].gallo.org): 930.250.1659 or " 406.413.7381.  -Svjt5dcve: text the word LIFE to 40471 for immediate support and crisis intervention  -Mental Health Consumer/Survivor Network of [place] (www.mhcsn.net): 850.170.5951 or 915-209-0935  -Mental Health Association of [place] (www.mentalhealth.org): 793.538.9845 or 363-544-9424     -Substance Abuse and Mental Health Services (www.samhsa.gov)  -Harm Reduction Coalition (www. Harmreduction.org)  -www.prescribetoprevent.org or http://prescribetoprevent.org/video  -Poison control 4-362-502-5965   **[place] Opioid Prevention Coalition: www.opioidcoalition.org    Sober Support Group Information:  AA/NA & Sponsor/Support  -Alcoholics Anonymous (www.alcoholics-anonymous.org): for local information 24 hours/day  -AA Intergroup service office in [place] (http://www.aastpauTechnology Underwriting the Greater Good (TUGG).org/) 784.863.5061  -AA Intergroup service office in [facility]: 674.574.2311. (http://www.a[place].org/)  -Narcotics Anonymous (www.nam[place].org) (116) 811-7975   **Sober Fun Activities: www.sober-activities.Parastructure/[facility]//[facility] Recovery Connection ([facility])  [facility] connects people seeking recovery to resources that help foster and sustain long-term recovery.  Whether you are seeking resources for treatment, transportation, housing, job training, education, health care or other pathways to recovery, [facility] is a great place to start.    Phone: 137.711.4333.  www.[employer] (Great listing of all types of recovery and non-recovery related resources)    Any follow up concerns:  Nursing questions call the [facility] [phone]  Medical Record call [phone]  Outpatient Behavioral Intake call [phone]  LP+ Wait List/Bed Availability call [phone]    The entire treatment team has appreciated the opportunity to work with you.  We wish you the best in the future and with your lifelong recovery goals. Please bring this discharge folder with you to all follow up appointments.  It contains your  lab results, diagnosis, medication list and discharge recommendations.    THANK YOU FOR CHOOSING THE [facility]

## 2021-07-12 ENCOUNTER — HOSPITAL ENCOUNTER (EMERGENCY)
Facility: HOSPITAL | Age: 36
Discharge: HOME | End: 2021-07-12
Attending: EMERGENCY MEDICINE | Admitting: EMERGENCY MEDICINE
Payer: COMMERCIAL

## 2021-07-12 VITALS
HEART RATE: 120 BPM | OXYGEN SATURATION: 97 % | RESPIRATION RATE: 10 BRPM | BODY MASS INDEX: 23.49 KG/M2 | TEMPERATURE: 98.3 F | DIASTOLIC BLOOD PRESSURE: 88 MMHG | WEIGHT: 155 LBS | HEIGHT: 68 IN | SYSTOLIC BLOOD PRESSURE: 131 MMHG

## 2021-07-12 DIAGNOSIS — F41.9 ANXIETY: ICD-10-CM

## 2021-07-12 PROCEDURE — 250N000013 HC RX MED GY IP 250 OP 250 PS 637: Performed by: STUDENT IN AN ORGANIZED HEALTH CARE EDUCATION/TRAINING PROGRAM

## 2021-07-12 PROCEDURE — 99283 EMERGENCY DEPT VISIT LOW MDM: CPT

## 2021-07-12 PROCEDURE — 93005 ELECTROCARDIOGRAM TRACING: CPT | Performed by: EMERGENCY MEDICINE

## 2021-07-12 RX ORDER — DIAZEPAM 5 MG
5 TABLET ORAL ONCE
Status: COMPLETED | OUTPATIENT
Start: 2021-07-12 | End: 2021-07-12

## 2021-07-12 RX ORDER — HYDROXYZINE PAMOATE 25 MG/1
25 CAPSULE ORAL 3 TIMES DAILY PRN
Qty: 12 CAPSULE | Refills: 0 | Status: ON HOLD | OUTPATIENT
Start: 2021-07-12 | End: 2021-07-16

## 2021-07-12 RX ADMIN — DIAZEPAM 5 MG: 5 TABLET ORAL at 11:43

## 2021-07-12 ASSESSMENT — MIFFLIN-ST. JEOR: SCORE: 1612.58

## 2021-07-12 NOTE — LETTER
July 12, 2021      Abhijit Saavedra  [address] [place] [address]        To whom it may concern,     Abhijit Saavedra was seen in the [facility] emergency department today 07/12/21. He is ok to return to work 07/13/21 without restrictions. Please call with any questions.  Sincerely,    Sam Campuzano MD

## 2021-07-12 NOTE — ED PROVIDER NOTES
I am seeing this patient along with Sam Cano MD.  I have seen and evaluated the patient independently at bedside and agree with the provider's history, assessment and plan.    Brief HPI: Abhijit Saavedra is a 36 year old male, history of alcohol use and anxiety, who presents to the Emergency Department for evaluation of tachycardia. Patient noticed a heart rate in 140s last night and felt anxious as well. He had a stressful day at work and drank 4 shots and a beer after 6 months of sobriety. Patient did not sleep well. He endorses currently feeling anxious but denies additional symptoms. History of feeling shaking from withdrawal previously but does not feel similar now.      Physical Exam:    Constitutional: Well developed, well nourished. Comfortable appearing. Mildly anxious.  HENT: Normocephalic, atraumatic, mucous membranes dry, nose normal  Eyes: PERRL, EOMI, conjunctiva normal, no discharge.  Neck- Supple, gross ROM intact.   Respiratory: Normal work of breathing, normal rate, speaks in full sentences  Cardiovascular: Tachycardic  Musculoskeletal: Moving all 4 extremities intentionally and without pain.  Neurologic: Alert & oriented x 3, cranial nerves grossly intact. Normal gross coordination  Psychiatric: Affect normal, cooperative.      ED COURSE & MEDICAL DECISION MAKING    Pertinent Labs and Imaging reviewed (see chart for details)    ED Course as of Jul 12 1259 Mon Jul 12, 2021   1110 35 yo with tachycardia. Hx of etoh use. Alcohol yesterday for the first time in months after period of sobriety. Tachycardia, anxiety, SOB. ECG shows sinus tachycardia      1117 EKG shows sinus tachycardia with a rate of 107.  No acute ischemic ST-T wave morphology.  Normal axis, normal intervals.  No prior for comparison.  Impression: Sinus tachycardia.      1131 BP: 127/85   1131 Pulse: 104   1141 Pt is anxious, we will give 5 mg valium, some water, discharge with reassurance. His father will pick him up and he  plans to attend AA meeting at 2pm today.        11:37 AM I introduced myself to patient and performed initial exam.      FINAL IMPRESSION    Diagnoses that have been ruled out:   None   Diagnoses that are still under consideration:   None   Final diagnoses:   Anxiety         Omar Welsh MD  Emergency Medicine  [facility]  7/12/2021 11:20 AM       Omar Welsh MD  07/12/21 1400

## 2021-07-12 NOTE — Clinical Note
Abhijit Saavedra was seen and treated in our emergency department on 7/12/2021.  He may return to work on 07/13/2021.        If you have any questions or concerns, please don't hesitate to call.      Sam Casanova MD

## 2021-07-12 NOTE — ED TRIAGE NOTES
Pt here with tachycardia and anxiety.  He states his dad showed up at his place of work and made him very anxious.  Has a hx of anxiety and normal heart rate is 110-120.  PMD aware of this.  Pt is anxious now, no chest pain or sob.

## 2021-07-12 NOTE — ED PROVIDER NOTES
Emergency Department Encounter     Evaluation Date & Time:   7/12/2021 10:52 AM    CHIEF COMPLAINT:  Tachycardia and Anxiety      Triage Note:Pt here with tachycardia and anxiety.  He states his dad showed up at his place of work and made him very anxious.  Has a hx of anxiety and normal heart rate is 110-120.  PMD aware of this.  Pt is anxious now, no chest pain or sob.        Impression and Plan     ED COURSE & MEDICAL DECISION MAKING:    EKG shows sinus tachycardia without ST changes. Likely tachycardia is related to on going anxiety since yesterday. Drank alcohol yesterday for the first time in 6 months, does not appear to be in withdrawal. No history of atrial fibrillation and sinus on EKG. No infectious symptoms. Denies illicit drug use. Discussed with patient normal EKG would treat with valium and reassess symptoms--patient agrees     Symptoms improved with valium. Likely this is due to anxiety. Safe to discharge home with return precautions. Follow up with PCP regarding anxiety and alcohol use.     At the conclusion of the encounter I discussed the results of all the tests and the disposition. The questions were answered. The patient or family acknowledged understanding and was agreeable with the care plan.    FINAL IMPRESSION:    ICD-10-CM    1. Anxiety  F41.9          Reassessments & Consults  ED Course as of Jul 12 1242   Mon Jul 12, 2021   1110 35 yo with tachycardia. Hx of etoh use. Alcohol yesterday for the first time in months after period of sobriety. Tachycardia, anxiety, SOB. ECG shows sinus tachycardia      1117 EKG shows sinus tachycardia with a rate of 107.  No acute ischemic ST-T wave morphology.  Normal axis, normal intervals.  No prior for comparison.  Impression: Sinus tachycardia.      1131 BP: 127/85   1131 Pulse: 104   1141 Pt is anxious, we will give 5 mg valium, some water, discharge with reassurance. His father will pick him up and he plans to attend AA meeting at 2pm today.           MEDICATIONS GIVEN IN THE EMERGENCY DEPARTMENT:  Medications   diazepam (VALIUM) tablet 5 mg (5 mg Oral Given 7/12/21 1143)       NEW PRESCRIPTIONS STARTED AT TODAY'S ED VISIT:  New Prescriptions    No medications on file       [facility]     HPI     Abhijit Saavedra is a 36 year old male with a pertinent history of anxiety, alcohol abuse who presents to this ED  for evaluation of tachycardia.    Noticed heart rate in the 140s on his fit bit last night. He had a stressful day with work and drank 4 shots of vodka and a beer after 6 months of sobriety. That evening he felt anxious at about the same time he noticed his high heart rate on the fit bit. He did not sleep well and then went to work. His dad picked him up from work to bring him to the ED for further evaluation. Currently he feels anxious. No chest pain, shortness of breath, palpitations, nausea, vomiting, diarrhea, dysuria, chills, or fevers. Has not taken anything for anxiety, has tried breathing exercises.   Chews tobacco, no drug use. Had been sober from alcohol for 6 moths until last night. In the past has felt shaky from alcohol withdrawal, does not feel that way today.   No history of afib or other arrhythmias that he knows of. Does not take medication regularly.     REVIEW OF SYSTEMS:  Review of Systems  Noted in [facility]      Medical History     Past Medical History:   Diagnosis Date     Alcohol use      Obstructive sleep apnea        History reviewed. No pertinent surgical history.    History reviewed. No pertinent family history.    Social History     Tobacco Use     Smoking status: Never Smoker     Smokeless tobacco: Never Used   Substance Use Topics     Alcohol use: Yes     Alcohol/week: 10.0 standard drinks     Types: 10 Shots of liquor per week     Comment: heavily, daily      Drug use: Never       calcium carbonate (TUMS) 500 MG chewable tablet  folic acid (FOLVITE) 1 MG tablet  ibuprofen (ADVIL/MOTRIN) 200 MG tablet  multivitamin w/minerals  "(THERA-VIT-M) tablet  pantoprazole (PROTONIX) 40 MG EC tablet        Physical Exam     First Vitals:  Patient Vitals for the past 24 hrs:   BP Temp Temp src Pulse Resp SpO2 Height Weight   07/12/21 1200 122/85 -- -- 101 10 94 % -- --   07/12/21 1118 127/85 -- -- 104 11 96 % -- --   07/12/21 1018 (!) 141/95 98.3  F (36.8  C) Temporal 134 20 96 % 1.727 m (5' 8\") 70.3 kg (155 lb)       PHYSICAL EXAM:   Physical Exam  GENERAL APPEARANCE: laying in the bed holding his chest, alert, pleasant conversation but appears to be in mild distress  EYES: Eyes grossly normal to inspection,  PERRL  HENT: ear canals and TM's normal and nose and mouth without ulcers or lesions  RESP: lungs clear to auscultation - no rales, rhonchi or wheezes. No increased work of breathing.   CV: tachycardic, regular rhythm, no murmurs.   ABDOMEN: soft, nontender, without hepatosplenomegaly or masses  MS: extremities normal- no gross deformities noted  NEURO: Normal strength and tone, sensory exam grossly normal, mentation appears intact and speech normal. No asterixis or tremors.   PSYCH: normal thought process and mentation, anxious     Results     LAB:  All pertinent labs reviewed and interpreted  Labs Ordered and Resulted from Time of ED Arrival Up to the Time of Departure from the ED - No data to display    RADIOLOGY:  No orders to display              ECG:    Performed at: [facility]    Impression: sinus tachycardia     Rate: 107  Rhythm: sinus  VT Interval: 114  QRS Interval: 98  QTc Interval: 456  ST Changes: none  Comparison: 12/18/2020    I have independently reviewed and interpreted the EKS(s) documented above        [facility] Documentation       Sam Campuzano MD   [facility] Resident   Pager: [phone]    Staffed with Dr. Welsh  Emergency Medicine  [facility] EMERGENCY DEPARTMENT       Sam Casanova MD  Resident  07/12/21 [id number]       Sam Casanova, " MD  Resident  07/12/21 7545

## 2021-07-14 ENCOUNTER — HOSPITAL ENCOUNTER (INPATIENT)
Facility: HOSPITAL | Age: 36
LOS: 4 days | Discharge: HOME | End: 2021-07-18
Attending: STUDENT IN AN ORGANIZED HEALTH CARE EDUCATION/TRAINING PROGRAM | Admitting: FAMILY MEDICINE
Payer: COMMERCIAL

## 2021-07-14 ENCOUNTER — HOSPITAL ENCOUNTER (INPATIENT)
Dept: CT IMAGING | Facility: HOSPITAL | Age: 36
End: 2021-07-14
Attending: STUDENT IN AN ORGANIZED HEALTH CARE EDUCATION/TRAINING PROGRAM
Payer: COMMERCIAL

## 2021-07-14 ENCOUNTER — HOSPITAL ENCOUNTER (EMERGENCY)
Dept: RADIOLOGY | Facility: HOSPITAL | Age: 36
End: 2021-07-14
Attending: STUDENT IN AN ORGANIZED HEALTH CARE EDUCATION/TRAINING PROGRAM
Payer: COMMERCIAL

## 2021-07-14 ENCOUNTER — TRANSFERRED RECORDS (OUTPATIENT)
Dept: HEALTH INFORMATION MANAGEMENT | Facility: CLINIC | Age: 36
End: 2021-07-14

## 2021-07-14 DIAGNOSIS — F10.20 SEVERE ALCOHOL USE DISORDER (H): Primary | ICD-10-CM

## 2021-07-14 DIAGNOSIS — F10.229 ALCOHOL DEPENDENCE WITH INTOXICATION WITH COMPLICATION (H): ICD-10-CM

## 2021-07-14 DIAGNOSIS — R41.82 ALTERED MENTAL STATUS, UNSPECIFIED ALTERED MENTAL STATUS TYPE: ICD-10-CM

## 2021-07-14 LAB
ALBUMIN SERPL-MCNC: 4.3 G/DL (ref 3.5–5)
ALP SERPL-CCNC: 132 U/L (ref 45–120)
ALT SERPL W P-5'-P-CCNC: 29 U/L (ref 0–45)
ANION GAP SERPL CALCULATED.3IONS-SCNC: 17 MMOL/L (ref 5–18)
APAP SERPL-MCNC: <3 UG/ML (ref 10–20)
AST SERPL W P-5'-P-CCNC: 25 U/L (ref 0–40)
ATRIAL RATE - MUSE: 107 BPM
ATRIAL RATE - MUSE: 147 BPM
BASE EXCESS BLDA CALC-SCNC: 1.9 MMOL/L
BASOPHILS # BLD AUTO: 0.1 10E3/UL (ref 0–0.2)
BASOPHILS NFR BLD AUTO: 0 %
BILIRUB SERPL-MCNC: 0.6 MG/DL (ref 0–1)
BUN SERPL-MCNC: 10 MG/DL (ref 8–22)
CALCIUM SERPL-MCNC: 9.1 MG/DL (ref 8.5–10.5)
CHLORIDE BLD-SCNC: 103 MMOL/L (ref 98–107)
CO2 SERPL-SCNC: 25 MMOL/L (ref 22–31)
COHGB MFR BLD: 98.8 % (ref 96–97)
CREAT SERPL-MCNC: 0.79 MG/DL (ref 0.7–1.3)
DIASTOLIC BLOOD PRESSURE - MUSE: 107 MMHG
DIASTOLIC BLOOD PRESSURE - MUSE: NORMAL MMHG
EOSINOPHIL # BLD AUTO: 0.1 10E3/UL (ref 0–0.7)
EOSINOPHIL NFR BLD AUTO: 1 %
ERYTHROCYTE [DISTWIDTH] IN BLOOD BY AUTOMATED COUNT: 15 % (ref 10–15)
ETHANOL SERPL-MCNC: 644 MG/DL
GFR SERPL CREATININE-BSD FRML MDRD: >90 ML/MIN/1.73M2
GLUCOSE BLD-MCNC: 140 MG/DL (ref 70–125)
HCO3 BLD-SCNC: 27 MMOL/L (ref 23–29)
HCT VFR BLD AUTO: 50.2 % (ref 40–53)
HGB BLD-MCNC: 17.1 G/DL (ref 13.3–17.7)
HOLD SPECIMEN: NORMAL
IMM GRANULOCYTES # BLD: 0.1 10E3/UL
IMM GRANULOCYTES NFR BLD: 0 %
INTERPRETATION ECG - MUSE: NORMAL
INTERPRETATION ECG - MUSE: NORMAL
LACTATE SERPL-SCNC: 3.9 MMOL/L (ref 0.7–2)
LACTATE SERPL-SCNC: 4 MMOL/L (ref 0.7–2)
LACTATE SERPL-SCNC: 4.2 MMOL/L (ref 0.7–2)
LYMPHOCYTES # BLD AUTO: 3.8 10E3/UL (ref 0.8–5.3)
LYMPHOCYTES NFR BLD AUTO: 30 %
MCH RBC QN AUTO: 29.6 PG (ref 26.5–33)
MCHC RBC AUTO-ENTMCNC: 34.1 G/DL (ref 31.5–36.5)
MCV RBC AUTO: 87 FL (ref 78–100)
MONOCYTES # BLD AUTO: 0.9 10E3/UL (ref 0–1.3)
MONOCYTES NFR BLD AUTO: 7 %
NEUTROPHILS # BLD AUTO: 7.8 10E3/UL (ref 1.6–8.3)
NEUTROPHILS NFR BLD AUTO: 62 %
NRBC # BLD AUTO: 0 10E3/UL
NRBC BLD AUTO-RTO: 0 /100
OXYHGB MFR BLD: 97.6 % (ref 96–97)
P AXIS - MUSE: 52 DEGREES
P AXIS - MUSE: 77 DEGREES
PCO2 BLD: 34 MM HG (ref 35–45)
PH BLD: 7.48 [PH] (ref 7.37–7.44)
PLATELET # BLD AUTO: 306 10E3/UL (ref 150–450)
PO2 BLD: 112 MM HG (ref 80–90)
POTASSIUM BLD-SCNC: 3.9 MMOL/L (ref 3.5–5)
PR INTERVAL - MUSE: 114 MS
PR INTERVAL - MUSE: 96 MS
PROT SERPL-MCNC: 7.9 G/DL (ref 6–8)
QRS DURATION - MUSE: 88 MS
QRS DURATION - MUSE: 98 MS
QT - MUSE: 286 MS
QT - MUSE: 342 MS
QTC - MUSE: 447 MS
QTC - MUSE: 456 MS
R AXIS - MUSE: 50 DEGREES
R AXIS - MUSE: 63 DEGREES
RBC # BLD AUTO: 5.78 10E6/UL (ref 4.4–5.9)
SALICYLATES SERPL-MCNC: <8 MG/DL (ref 2–25)
SARS-COV-2 RNA RESP QL NAA+PROBE: NEGATIVE
SODIUM SERPL-SCNC: 145 MMOL/L (ref 136–145)
SYSTOLIC BLOOD PRESSURE - MUSE: 157 MMHG
SYSTOLIC BLOOD PRESSURE - MUSE: NORMAL MMHG
T AXIS - MUSE: 29 DEGREES
T AXIS - MUSE: 40 DEGREES
TEMPERATURE: 37 DEGREES C
VENTRICULAR RATE- MUSE: 107 BPM
VENTRICULAR RATE- MUSE: 147 BPM
WBC # BLD AUTO: 12.6 10E3/UL (ref 4–11)

## 2021-07-14 PROCEDURE — 71045 X-RAY EXAM CHEST 1 VIEW: CPT

## 2021-07-14 PROCEDURE — C9803 HOPD COVID-19 SPEC COLLECT: HCPCS

## 2021-07-14 PROCEDURE — 31500 INSERT EMERGENCY AIRWAY: CPT

## 2021-07-14 PROCEDURE — 85004 AUTOMATED DIFF WBC COUNT: CPT | Performed by: STUDENT IN AN ORGANIZED HEALTH CARE EDUCATION/TRAINING PROGRAM

## 2021-07-14 PROCEDURE — 96374 THER/PROPH/DIAG INJ IV PUSH: CPT | Mod: 59

## 2021-07-14 PROCEDURE — 5A1935Z RESPIRATORY VENTILATION, LESS THAN 24 CONSECUTIVE HOURS: ICD-10-PCS | Performed by: STUDENT IN AN ORGANIZED HEALTH CARE EDUCATION/TRAINING PROGRAM

## 2021-07-14 PROCEDURE — 96366 THER/PROPH/DIAG IV INF ADDON: CPT

## 2021-07-14 PROCEDURE — 82040 ASSAY OF SERUM ALBUMIN: CPT | Performed by: STUDENT IN AN ORGANIZED HEALTH CARE EDUCATION/TRAINING PROGRAM

## 2021-07-14 PROCEDURE — 96376 TX/PRO/DX INJ SAME DRUG ADON: CPT

## 2021-07-14 PROCEDURE — 999N000157 HC STATISTIC RCP TIME EA 10 MIN

## 2021-07-14 PROCEDURE — 80179 DRUG ASSAY SALICYLATE: CPT | Performed by: STUDENT IN AN ORGANIZED HEALTH CARE EDUCATION/TRAINING PROGRAM

## 2021-07-14 PROCEDURE — 200N000001 HC R&B ICU

## 2021-07-14 PROCEDURE — 99292 CRITICAL CARE ADDL 30 MIN: CPT

## 2021-07-14 PROCEDURE — 82805 BLOOD GASES W/O2 SATURATION: CPT | Performed by: STUDENT IN AN ORGANIZED HEALTH CARE EDUCATION/TRAINING PROGRAM

## 2021-07-14 PROCEDURE — 94002 VENT MGMT INPAT INIT DAY: CPT

## 2021-07-14 PROCEDURE — 36415 COLL VENOUS BLD VENIPUNCTURE: CPT | Performed by: NURSE PRACTITIONER

## 2021-07-14 PROCEDURE — 93005 ELECTROCARDIOGRAM TRACING: CPT | Performed by: STUDENT IN AN ORGANIZED HEALTH CARE EDUCATION/TRAINING PROGRAM

## 2021-07-14 PROCEDURE — 250N000009 HC RX 250: Performed by: STUDENT IN AN ORGANIZED HEALTH CARE EDUCATION/TRAINING PROGRAM

## 2021-07-14 PROCEDURE — 999N000185 HC STATISTIC TRANSPORT TIME EA 15 MIN

## 2021-07-14 PROCEDURE — 999N000066 HC STATISTIC EXTERNAL/OUTSIDE TRANSPORT

## 2021-07-14 PROCEDURE — 250N000011 HC RX IP 250 OP 636: Performed by: INTERNAL MEDICINE

## 2021-07-14 PROCEDURE — 96375 TX/PRO/DX INJ NEW DRUG ADDON: CPT

## 2021-07-14 PROCEDURE — 250N000009 HC RX 250: Performed by: INTERNAL MEDICINE

## 2021-07-14 PROCEDURE — 87635 SARS-COV-2 COVID-19 AMP PRB: CPT | Performed by: STUDENT IN AN ORGANIZED HEALTH CARE EDUCATION/TRAINING PROGRAM

## 2021-07-14 PROCEDURE — 70450 CT HEAD/BRAIN W/O DYE: CPT

## 2021-07-14 PROCEDURE — 258N000003 HC RX IP 258 OP 636: Performed by: INTERNAL MEDICINE

## 2021-07-14 PROCEDURE — 36415 COLL VENOUS BLD VENIPUNCTURE: CPT | Performed by: INTERNAL MEDICINE

## 2021-07-14 PROCEDURE — 99291 CRITICAL CARE FIRST HOUR: CPT | Performed by: INTERNAL MEDICINE

## 2021-07-14 PROCEDURE — 250N000013 HC RX MED GY IP 250 OP 250 PS 637: Performed by: INTERNAL MEDICINE

## 2021-07-14 PROCEDURE — 999N000055 HC STATISTIC END TITIAL CO2 MONITORING

## 2021-07-14 PROCEDURE — 36592 COLLECT BLOOD FROM PICC: CPT | Performed by: STUDENT IN AN ORGANIZED HEALTH CARE EDUCATION/TRAINING PROGRAM

## 2021-07-14 PROCEDURE — 96365 THER/PROPH/DIAG IV INF INIT: CPT | Mod: 59

## 2021-07-14 PROCEDURE — 0BH17EZ INSERTION OF ENDOTRACHEAL AIRWAY INTO TRACHEA, VIA NATURAL OR ARTIFICIAL OPENING: ICD-10-PCS | Performed by: STUDENT IN AN ORGANIZED HEALTH CARE EDUCATION/TRAINING PROGRAM

## 2021-07-14 PROCEDURE — 36600 WITHDRAWAL OF ARTERIAL BLOOD: CPT

## 2021-07-14 PROCEDURE — 83735 ASSAY OF MAGNESIUM: CPT | Performed by: NURSE PRACTITIONER

## 2021-07-14 PROCEDURE — 258N000003 HC RX IP 258 OP 636: Performed by: STUDENT IN AN ORGANIZED HEALTH CARE EDUCATION/TRAINING PROGRAM

## 2021-07-14 PROCEDURE — 80143 DRUG ASSAY ACETAMINOPHEN: CPT | Performed by: STUDENT IN AN ORGANIZED HEALTH CARE EDUCATION/TRAINING PROGRAM

## 2021-07-14 PROCEDURE — 83605 ASSAY OF LACTIC ACID: CPT | Performed by: STUDENT IN AN ORGANIZED HEALTH CARE EDUCATION/TRAINING PROGRAM

## 2021-07-14 PROCEDURE — HZ2ZZZZ DETOXIFICATION SERVICES FOR SUBSTANCE ABUSE TREATMENT: ICD-10-PCS | Performed by: STUDENT IN AN ORGANIZED HEALTH CARE EDUCATION/TRAINING PROGRAM

## 2021-07-14 PROCEDURE — 84100 ASSAY OF PHOSPHORUS: CPT | Performed by: NURSE PRACTITIONER

## 2021-07-14 PROCEDURE — 250N000011 HC RX IP 250 OP 636: Performed by: STUDENT IN AN ORGANIZED HEALTH CARE EDUCATION/TRAINING PROGRAM

## 2021-07-14 PROCEDURE — 83605 ASSAY OF LACTIC ACID: CPT | Performed by: INTERNAL MEDICINE

## 2021-07-14 PROCEDURE — 99291 CRITICAL CARE FIRST HOUR: CPT | Mod: 25

## 2021-07-14 PROCEDURE — 250N000011 HC RX IP 250 OP 636

## 2021-07-14 PROCEDURE — 99223 1ST HOSP IP/OBS HIGH 75: CPT | Mod: AI | Performed by: STUDENT IN AN ORGANIZED HEALTH CARE EDUCATION/TRAINING PROGRAM

## 2021-07-14 PROCEDURE — 82077 ASSAY SPEC XCP UR&BREATH IA: CPT | Performed by: STUDENT IN AN ORGANIZED HEALTH CARE EDUCATION/TRAINING PROGRAM

## 2021-07-14 RX ORDER — PANTOPRAZOLE SODIUM 20 MG/1
40 TABLET, DELAYED RELEASE ORAL
Status: DISCONTINUED | OUTPATIENT
Start: 2021-07-15 | End: 2021-07-16

## 2021-07-14 RX ORDER — FOLIC ACID 5 MG/ML
1 INJECTION, SOLUTION INTRAMUSCULAR; INTRAVENOUS; SUBCUTANEOUS DAILY
Status: DISCONTINUED | OUTPATIENT
Start: 2021-07-14 | End: 2021-07-15 | Stop reason: ALTCHOICE

## 2021-07-14 RX ORDER — NALOXONE HYDROCHLORIDE 0.4 MG/ML
0.2 INJECTION, SOLUTION INTRAMUSCULAR; INTRAVENOUS; SUBCUTANEOUS
Status: DISCONTINUED | OUTPATIENT
Start: 2021-07-14 | End: 2021-07-18 | Stop reason: HOSPADM

## 2021-07-14 RX ORDER — GABAPENTIN 300 MG/1
600 CAPSULE ORAL EVERY 8 HOURS
Status: DISCONTINUED | OUTPATIENT
Start: 2021-07-18 | End: 2021-07-15

## 2021-07-14 RX ORDER — GABAPENTIN 300 MG/1
1200 CAPSULE ORAL ONCE
Status: DISCONTINUED | OUTPATIENT
Start: 2021-07-14 | End: 2021-07-15

## 2021-07-14 RX ORDER — ETOMIDATE 2 MG/ML
10 INJECTION INTRAVENOUS ONCE
Status: COMPLETED | OUTPATIENT
Start: 2021-07-14 | End: 2021-07-14

## 2021-07-14 RX ORDER — NALOXONE HYDROCHLORIDE 0.4 MG/ML
0.4 INJECTION, SOLUTION INTRAMUSCULAR; INTRAVENOUS; SUBCUTANEOUS
Status: DISCONTINUED | OUTPATIENT
Start: 2021-07-14 | End: 2021-07-18 | Stop reason: HOSPADM

## 2021-07-14 RX ORDER — PROPOFOL 10 MG/ML
INJECTION, EMULSION INTRAVENOUS
Status: COMPLETED
Start: 2021-07-14 | End: 2021-07-14

## 2021-07-14 RX ORDER — CLONIDINE HYDROCHLORIDE 0.1 MG/1
0.1 TABLET ORAL EVERY 8 HOURS
Status: DISCONTINUED | OUTPATIENT
Start: 2021-07-14 | End: 2021-07-15

## 2021-07-14 RX ORDER — ETOMIDATE 2 MG/ML
10 INJECTION INTRAVENOUS ONCE
Status: DISCONTINUED | OUTPATIENT
Start: 2021-07-14 | End: 2021-07-14

## 2021-07-14 RX ORDER — FENTANYL CITRATE 50 UG/ML
50-100 INJECTION, SOLUTION INTRAMUSCULAR; INTRAVENOUS
Status: DISCONTINUED | OUTPATIENT
Start: 2021-07-14 | End: 2021-07-14 | Stop reason: CLARIF

## 2021-07-14 RX ORDER — ONDANSETRON 2 MG/ML
4 INJECTION INTRAMUSCULAR; INTRAVENOUS EVERY 6 HOURS PRN
Status: DISCONTINUED | OUTPATIENT
Start: 2021-07-14 | End: 2021-07-18 | Stop reason: HOSPADM

## 2021-07-14 RX ORDER — GABAPENTIN 600 MG/1
1200 TABLET ORAL ONCE
Status: DISCONTINUED | OUTPATIENT
Start: 2021-07-14 | End: 2021-07-14 | Stop reason: DRUGHIGH

## 2021-07-14 RX ORDER — HALOPERIDOL 5 MG/ML
2.5-5 INJECTION INTRAMUSCULAR EVERY 4 HOURS PRN
Status: DISCONTINUED | OUTPATIENT
Start: 2021-07-14 | End: 2021-07-18 | Stop reason: HOSPADM

## 2021-07-14 RX ORDER — GABAPENTIN 100 MG/1
100 CAPSULE ORAL EVERY 8 HOURS
Status: DISCONTINUED | OUTPATIENT
Start: 2021-07-22 | End: 2021-07-15

## 2021-07-14 RX ORDER — FLUMAZENIL 0.1 MG/ML
0.2 INJECTION, SOLUTION INTRAVENOUS
Status: DISCONTINUED | OUTPATIENT
Start: 2021-07-14 | End: 2021-07-18 | Stop reason: HOSPADM

## 2021-07-14 RX ORDER — GABAPENTIN 300 MG/1
900 CAPSULE ORAL EVERY 8 HOURS
Status: DISCONTINUED | OUTPATIENT
Start: 2021-07-15 | End: 2021-07-15

## 2021-07-14 RX ORDER — THIAMINE HYDROCHLORIDE 100 MG/ML
100 INJECTION, SOLUTION INTRAMUSCULAR; INTRAVENOUS DAILY
Status: DISCONTINUED | OUTPATIENT
Start: 2021-07-14 | End: 2021-07-18 | Stop reason: HOSPADM

## 2021-07-14 RX ORDER — METOPROLOL TARTRATE 1 MG/ML
5 INJECTION, SOLUTION INTRAVENOUS EVERY 6 HOURS PRN
Status: DISCONTINUED | OUTPATIENT
Start: 2021-07-14 | End: 2021-07-15

## 2021-07-14 RX ORDER — ROPIVACAINE IN 0.9% SOD CHL/PF 0.1 %
.03-.125 PLASTIC BAG, INJECTION (ML) EPIDURAL CONTINUOUS
Status: DISCONTINUED | OUTPATIENT
Start: 2021-07-14 | End: 2021-07-15

## 2021-07-14 RX ORDER — PROPOFOL 10 MG/ML
5-75 INJECTION, EMULSION INTRAVENOUS CONTINUOUS
Status: DISCONTINUED | OUTPATIENT
Start: 2021-07-14 | End: 2021-07-15

## 2021-07-14 RX ORDER — SODIUM CHLORIDE 9 MG/ML
INJECTION, SOLUTION INTRAVENOUS CONTINUOUS
Status: DISCONTINUED | OUTPATIENT
Start: 2021-07-14 | End: 2021-07-14

## 2021-07-14 RX ORDER — CHLORHEXIDINE GLUCONATE ORAL RINSE 1.2 MG/ML
15 SOLUTION DENTAL EVERY 12 HOURS
Status: DISCONTINUED | OUTPATIENT
Start: 2021-07-14 | End: 2021-07-14

## 2021-07-14 RX ORDER — GABAPENTIN 300 MG/1
300 CAPSULE ORAL EVERY 8 HOURS
Status: DISCONTINUED | OUTPATIENT
Start: 2021-07-20 | End: 2021-07-15

## 2021-07-14 RX ADMIN — FENTANYL CITRATE 50 MCG: 50 INJECTION, SOLUTION INTRAMUSCULAR; INTRAVENOUS at 17:11

## 2021-07-14 RX ADMIN — PROPOFOL 55 MCG/KG/MIN: 10 INJECTION, EMULSION INTRAVENOUS at 17:04

## 2021-07-14 RX ADMIN — PROPOFOL 25 MCG/KG/MIN: 10 INJECTION, EMULSION INTRAVENOUS at 13:32

## 2021-07-14 RX ADMIN — SODIUM CHLORIDE 1000 ML: 9 INJECTION, SOLUTION INTRAVENOUS at 13:30

## 2021-07-14 RX ADMIN — ONDANSETRON 4 MG: 2 INJECTION INTRAMUSCULAR; INTRAVENOUS at 21:56

## 2021-07-14 RX ADMIN — SODIUM CHLORIDE 1000 ML: 9 INJECTION, SOLUTION INTRAVENOUS at 17:39

## 2021-07-14 RX ADMIN — HYDROMORPHONE HYDROCHLORIDE 1 MG: 1 INJECTION, SOLUTION INTRAMUSCULAR; INTRAVENOUS; SUBCUTANEOUS at 15:00

## 2021-07-14 RX ADMIN — HYDROMORPHONE HYDROCHLORIDE 1 MG: 1 INJECTION, SOLUTION INTRAMUSCULAR; INTRAVENOUS; SUBCUTANEOUS at 13:58

## 2021-07-14 RX ADMIN — Medication 0.2 MCG/KG/HR: at 21:29

## 2021-07-14 RX ADMIN — THIAMINE HYDROCHLORIDE 100 MG: 100 INJECTION, SOLUTION INTRAMUSCULAR; INTRAVENOUS at 19:16

## 2021-07-14 RX ADMIN — SODIUM CHLORIDE: 9 INJECTION, SOLUTION INTRAVENOUS at 16:21

## 2021-07-14 RX ADMIN — MIDAZOLAM HYDROCHLORIDE 5 MG: 1 INJECTION, SOLUTION INTRAMUSCULAR; INTRAVENOUS at 13:32

## 2021-07-14 RX ADMIN — FOLIC ACID 1 MG: 5 INJECTION, SOLUTION INTRAMUSCULAR; INTRAVENOUS; SUBCUTANEOUS at 19:16

## 2021-07-14 RX ADMIN — CHLORHEXIDINE GLUCONATE 0.12% ORAL RINSE 15 ML: 1.2 LIQUID ORAL at 21:31

## 2021-07-14 RX ADMIN — ETOMIDATE 10 MG: 2 INJECTION, SOLUTION INTRAVENOUS at 13:29

## 2021-07-14 RX ADMIN — SODIUM CHLORIDE: 9 INJECTION, SOLUTION INTRAVENOUS at 14:27

## 2021-07-14 RX ADMIN — FENTANYL CITRATE 50 MCG: 50 INJECTION, SOLUTION INTRAMUSCULAR; INTRAVENOUS at 19:58

## 2021-07-14 ASSESSMENT — MIFFLIN-ST. JEOR: SCORE: 1621.2

## 2021-07-14 NOTE — CONSULTS
PULMONARY/CRITICAL CARE CONSULT NOTE    Date / Time of Admission: 7/14/2021  1:12 PM    Assessment:    36yoM with ETOH abuse history who presented obtunded from alcohol intoxication, intubated for airway protection.     Active Problems:    Altered mental status, unspecified altered mental status type      Advance Directives: Presumed full code    Critical Care Time greater than: 30 Minutes; this patient is critically ill and requires ICU monitoring for airway protection and mechanical ventilation.     Plan:  Pulmonary:  1) Airway protection  -Liberalize tidal volume for more comfort  -SBT once mental status allows    Neuro:  1) Acute ETOH intoxication without additional known ingestion  -ASA/Tylenol level negative  -Propofol with PRN fentanyl and versed for sedation  -Increasing RASS goal to start to wake him up.  -Thiamine/Folate IV for now, NO OG access  -Head CT negative for acute trauma.     C/V:  1) Mild hypotension in setting of sedation  -Hold on central line unless unable to extubate and must keep sedated    Renal:  1) Lactic Acidosis from ETOH  -Repeat in several hours after volume,   -No evidence of infection present    Heme:  1) Elevated hematocrit suggests volume contraction  -Volume resuscitation            Subjective:    Cc: unresponsive    HPI: 36 year old male with history of ETOH abuse presented with friends concerned for his unresponsiveness. Immediately intubated in ED for airway protection. ETOH >600, no additional tox positive. Started becoming dyssynchronous with vent as started to wake up but resedated.    No additional history from patient due to mental status.     Past Medical History:   Diagnosis Date     Alcohol use      Obstructive sleep apnea        Social History     Tobacco Use     Smoking status: Never Smoker     Smokeless tobacco: Never Used   Substance Use Topics     Alcohol use: Yes     Alcohol/week: 10.0 standard drinks     Types: 10 Shots of liquor per week     Comment: heavily,  daily        No family history on file.    Current Facility-Administered Medications   Medication     propofol (DIPRIVAN) 1000 MG/100ML infusion     0.9% sodium chloride BOLUS     chlorhexidine (PERIDEX) 0.12 % solution 15 mL     fentaNYL (PF) (SUBLIMAZE) injection  mcg     folic acid injection 1 mg     midazolam (VERSED) injection 1 mg     naloxone (NARCAN) injection 0.2 mg    Or     naloxone (NARCAN) injection 0.4 mg    Or     naloxone (NARCAN) injection 0.2 mg    Or     naloxone (NARCAN) injection 0.4 mg     norepinephrine (LEVOPHED) 4 mg in  mL PERIPHERAL infusion     [START ON 7/15/2021] pantoprazole (PROTONIX) EC tablet 40 mg    Or     [START ON 7/15/2021] pantoprazole (PROTONIX) 2 mg/mL suspension 40 mg    Or     [START ON 7/15/2021] pantoprazole (PROTONIX) IV push injection 40 mg     propofol (DIPRIVAN) infusion     sodium chloride 0.9% infusion     thiamine (B-1) injection 100 mg       Review of Systems: Unable to obtain.     Objective:  Vital signs:  BP 90/54   Pulse 79   Temp 97.8  F (36.6  C) (Oral)   Resp 14   SpO2 97%     Vent settings for last 24 hours:    Ventilation Mode: CMV/AC  (Continuous Mandatory Ventilation/ Assist Control)  Rate Set (breaths/minute): (S) 14 breaths/min  Tidal Volume Set (mL): 490 mL  PEEP (cm H2O): 5 cmH2O  Oxygen Concentration (%): (S) 30 %  Resp: 14    General appearance: appears stated age and intubated and sedated  Neck: no adenopathy and supple, symmetrical, trachea midline  Lungs: clear to auscultation bilaterally  Heart: RRR, S1 and S2  Abdomen: soft, non-tender; bowel sounds normal; no masses,  no organomegaly  Extremities: No edema or cyanosis  Skin: papular rash on trunk and thighs.   Neurologic: pupils equal, sedated, not following commands or withdrawing to pain.     Jody Duong MD      Data    CXR: personally reviewed  EXAM: XR CHEST PORT 1 VIEW  LOCATION: [facility]  DATE/TIME: 7/14/2021 1:30 PM     INDICATION: post  intubation  COMPARISON: None.                                                                      IMPRESSION: ET tube 3.5 cm above the jessica. Chest otherwise negative. Lungs are expanded and clear.    Head CT  EXAM: CT HEAD W/O CONTRAST  LOCATION: [facility]  DATE/TIME: 2021 2:07 PM     INDICATION: Mental status change, unknown cause  COMPARISON: None.  TECHNIQUE: Routine CT Head without IV contrast. Multiplanar reformats. Dose reduction techniques were used.     FINDINGS:  INTRACRANIAL CONTENTS: No evidence of acute intracranial hemorrhage or mass effect. Brain attenuation morphology are normal. The ventricles and sulci are normal for age. Normal gray-white matter differentiation. The basilar cisterns are patent.     VISUALIZED ORBITS/SINUSES/MASTOIDS: The globes are unremarkable. The partially imaged paranasal sinuses, mastoid air cells and middle ear cavities are unremarkable.      BONES/SOFT TISSUES: The visualized skull base and calvarium are unremarkable.                                                                      IMPRESSION:    1.  No evidence of acute intracranial hemorrhage or mass effect.      Laboratory:    Sodium 145, Potassium 3.9, Chloride 103, Bicarb 25, BUN 10, Creat 0.79    Lab Results   Component Value Date    WBC 12.6 (H) 2021    HGB 17.1 2021    HCT 50.2 2021    MCV 87 2021     2021     Lactate 3.9    AB.48/34/112/27

## 2021-07-14 NOTE — ED PROVIDER NOTES
EMERGENCY DEPARTMENT ENCOUNTER      NAME: Abhijit Saavedra  AGE: 36 year old male  YOB: 1985  MRN: [id number]  EVALUATION DATE & TIME: 2021  1:12 PM    PCP: No Ref-Primary, Physician    ED PROVIDER: Kasia Ruiz MD    Chief Complaint   Patient presents with     Alcohol Intoxication     Altered Mental Status       FINAL IMPRESSION:  1. Altered mental status, unspecified altered mental status type        ED COURSE & MEDICAL DECISION MAKIN:08 PM I met with the patient to gather history and perform an initial exam. I was wearing PPE including gloves, N95 mask, and surgical mask.  1:11 PM Patient's blood sugar is 130.  1:14 PM RT arrived to patient's room.  1:15 PM 10 mg etomidate given.  1:16  mg succinylcholine given.  1:17 PM Patient successfully intubated.  1:44 PM Lab called and informed me that patient's lactic acid is 4.2.  2:24 PM I spoke with ICU intensivist Dr. Duong. Patient accepted for admission.    36 year old old male who presents to the ED for evaluation of altered mental status.   History and physical examination as documented. Vital signs notable for tachycardia.   Patient was dropped off at the emergency department for intoxication.  He was with his friends and they stated he was drinking heavily and was minimally responsive.  He is continually sneezing.  Has absolutely no tone.  Not following commands.  Does not withdraw to pain.  I am concerned he is not protecting his airway and he is having snoring respirations.  Intubated with etomidate and succinylcholine with RSI approach.  Successful placement of 7-1/2 tube.  Sedation with propofol and as needed Versed and Dilaudid.  IV fluids running.  Improving tachycardia with adequate sedation and IV fluids.  His pupils were 5 to 6 mm and reactive.  This presentation is not consistent with an opiate overdose and I did not give IV Narcan.  His friend brought a medication with and stated that he maybe took a Cialis.  Most likely  suspicious for alcohol intoxication but did not have confirmation so ordered ingestion work-up.  His Tylenol level is negative.  Salicylate is also negative.  His labs are notable for a lactic acid of 4.2.  He has fluids going and we will recheck it.  He does have a mild leukocytosis but likely is just stress demargination.  There are no other findings to suggest acute infection.  Likely has hypovolemia causing his lactic acidosis.  Blood alcohol returned in the 600 range.  I discussed the case with the intensivist on-call who accepted him to the medical ICU.         Critical Care  Performed by: Kasia Ruiz MD  Authorized by: Kasia Ruiz MD     Total critical care time: 60 minutes  Critical care time was exclusive of separately billable procedures and treating other patients.  Critical care was necessary to treat or prevent imminent or life-threatening deterioration of the following conditions: acute alcohol intoxication with altered mental status, not protecting his airway  Critical care was time spent personally by me on the following activities: development of treatment plan with patient or surrogate, discussions with consultants, examination of patient, evaluation of patient's response to treatment, obtaining history from patient or surrogate, ordering and performing treatments and interventions, ordering and review of laboratory studies, ordering and review of radiographic studies and re-evaluation of patient's condition, this excludes any separately billable procedures.    MEDICATIONS GIVEN IN THE EMERGENCY:  Medications   0.9% sodium chloride BOLUS (0 mLs Intravenous Stopped 7/14/21 1424)     Followed by   sodium chloride 0.9% infusion ( Intravenous New Bag 7/14/21 1427)   propofol (DIPRIVAN) infusion (60 mcg/kg/min × 70.3 kg Intravenous Rate/Dose Change 7/14/21 1400)   midazolam (VERSED) injection 5 mg (5 mg Intravenous Given 7/14/21 1332)   etomidate (AMIDATE) injection 10 mg (10 mg Intravenous Given 7/14/21  1329)   succinylcholine (ANECTINE) injection 100 mg (100 mg Intravenous Given 7/14/21 1327)   HYDROmorphone (DILAUDID) injection 1 mg (1 mg Intravenous Given 7/14/21 1358)       NEW PRESCRIPTIONS STARTED AT TODAY'S ER VISIT  New Prescriptions    No medications on file          =================================================================    HPI    Patient information was obtained from: Patient's co-workers. Patient's history limited due to altered mental status.    Use of : N/A    Abhijit Saavedra is a 36 year old male with a pertinent history of alcohol abuse, anxiety, and tachycardia who presents to this ED via walk-in for evaluation of altered mental status.    Per chart review,  Patient was seen in the ED 7/12/21 for evaluation of anxiety. Patient was feeling anxious after a stressful day of work and noticed his heart rate was in the 140's. He drank 4 shots and a beer after being sober for 6 months. Patient given 5 mg valium for anxiety and was discharged home with his father will plans to attend an AA meeting.    Per patient's co-workers,  The patient started taking Sildenafil 20 mg and then became altered.       REVIEW OF SYSTEMS   Review of Systems   Unable to perform ROS: Mental status change   HENT: Positive for sneezing.    Respiratory:        Positive for difficulty breathing.   Psychiatric/Behavioral:        Positive for altered mental status.       PAST MEDICAL HISTORY:  Past Medical History:   Diagnosis Date     Alcohol use      Obstructive sleep apnea        PAST SURGICAL HISTORY:  No past surgical history on file.    ALLERGIES:  Allergies   Allergen Reactions     Amoxicillin Rash     Pcn [Penicillins] Rash       FAMILY HISTORY:  No family history on file.    SOCIAL HISTORY:   Social History     Socioeconomic History     Marital status: Single     Spouse name: Not on file     Number of children: Not on file     Years of education: Not on file     Highest education level: Not on file    Occupational History     Not on file   Tobacco Use     Smoking status: Never Smoker     Smokeless tobacco: Never Used   Substance and Sexual Activity     Alcohol use: Yes     Alcohol/week: 10.0 standard drinks     Types: 10 Shots of liquor per week     Comment: heavily, daily      Drug use: Never     Sexual activity: Not on file   Other Topics Concern     Not on file   Social History Narrative     Not on file     Social Determinants of Health     Financial Resource Strain:      Difficulty of Paying Living Expenses:    Food Insecurity:      Worried About Running Out of Food in the Last Year:      Ran Out of Food in the Last Year:    Transportation Needs:      Lack of Transportation (Medical):      Lack of Transportation (Non-Medical):    Physical Activity:      Days of Exercise per Week:      Minutes of Exercise per Session:    Stress:      Feeling of Stress :    Social Connections:      Frequency of Communication with Friends and Family:      Frequency of Social Gatherings with Friends and Family:      Attends Orthodox Services:      Active Member of Clubs or Organizations:      Attends Club or Organization Meetings:      Marital Status:    Intimate Partner Violence:      Fear of Current or Ex-Partner:      Emotionally Abused:      Physically Abused:      Sexually Abused:        VITALS:  /60   Pulse 108   Resp 22   SpO2 96%     PHYSICAL EXAM    General: Intermittently sneezing. Unable to maintain any tone.  HEENT: No external signs of trauma. Oropharynx clear and moist. Pupils 6 cm and equal.  Chest/Pulm: No tenderness to palpation. Snoring respirations.   CV: Tachycardic rate and regular rhythm without murmurs.  Abdomen: Soft and non-distended without tenderness to palpation.  MSK/Extremities: Actively moving all four extremities. No pedal edema.  Skin: No visible rashes  Neuro: Not following commands. Non verbal.    LAB:  All pertinent labs reviewed and interpreted.  Results for orders placed or  performed during the hospital encounter of 07/14/21   CT Head w/o Contrast    Impression    IMPRESSION:    1.  No evidence of acute intracranial hemorrhage or mass effect.   XR Chest Port 1 View    Impression    IMPRESSION: ET tube 3.5 cm above the jessica. Chest otherwise negative. Lungs are expanded and clear.   Extra Red Top Tube   Result Value Ref Range    Hold Specimen JIC    Extra Green Top (Lithium Heparin) Tube   Result Value Ref Range    Hold Specimen JIC    Extra Purple Top Tube   Result Value Ref Range    Hold Specimen JIC    Extra Green Top (Lithium Heparin) ON ICE   Result Value Ref Range    Hold Specimen JIC    Comprehensive metabolic panel   Result Value Ref Range    Sodium 145 136 - 145 mmol/L    Potassium 3.9 3.5 - 5.0 mmol/L    Chloride 103 98 - 107 mmol/L    Carbon Dioxide (CO2) 25 22 - 31 mmol/L    Anion Gap 17 5 - 18 mmol/L    Urea Nitrogen 10 8 - 22 mg/dL    Creatinine 0.79 0.70 - 1.30 mg/dL    Calcium 9.1 8.5 - 10.5 mg/dL    Glucose 140 (H) 70 - 125 mg/dL    Alkaline Phosphatase 132 (H) 45 - 120 U/L    AST 25 0 - 40 U/L    ALT 29 0 - 45 U/L    Protein Total 7.9 6.0 - 8.0 g/dL    Albumin 4.3 3.5 - 5.0 g/dL    Bilirubin Total 0.6 0.0 - 1.0 mg/dL    GFR Estimate >90 >60 mL/min/1.73m2   Lactic acid whole blood   Result Value Ref Range    Lactic Acid 4.2 (HH) 0.7 - 2.0 mmol/L   Ethyl Alcohol Level   Result Value Ref Range    Alcohol, Blood 644 (H) None detected mg/dL   Acetaminophen level   Result Value Ref Range    Acetaminophen <3.0 (L) 10.0 - 20.0 ug/mL   Result Value Ref Range    Salicylate <8 2 - 25 mg/dL   CBC with platelets and differential   Result Value Ref Range    WBC Count 12.6 (H) 4.0 - 11.0 10e3/uL    RBC Count 5.78 4.40 - 5.90 10e6/uL    Hemoglobin 17.1 13.3 - 17.7 g/dL    Hematocrit 50.2 40.0 - 53.0 %    MCV 87 78 - 100 fL    MCH 29.6 26.5 - 33.0 pg    MCHC 34.1 31.5 - 36.5 g/dL    RDW 15.0 10.0 - 15.0 %    Platelet Count 306 150 - 450 10e3/uL    % Neutrophils 62 %    % Lymphocytes  30 %    % Monocytes 7 %    % Eosinophils 1 %    % Basophils 0 %    % Immature Granulocytes 0 %    NRBCs per 100 WBC 0 <1 /100    Absolute Neutrophils 7.8 1.6 - 8.3 10e3/uL    Absolute Lymphocytes 3.8 0.8 - 5.3 10e3/uL    Absolute Monocytes 0.9 0.0 - 1.3 10e3/uL    Absolute Eosinophils 0.1 0.0 - 0.7 10e3/uL    Absolute Basophils 0.1 0.0 - 0.2 10e3/uL    Absolute Immature Granulocytes 0.1 (H) <=0.0 10e3/uL    Absolute NRBCs 0.0 10e3/uL   Blood gas arterial   Result Value Ref Range    pH Arterial 7.48 (H) 7.37 - 7.44    pCO2 Arterial 34 (L) 35 - 45 mm Hg    pO2 Arterial 112 (H) 80 - 90 mm Hg    Bicarbonate Arterial 27 23 - 29 mmol/L    O2 Sat, Arterial 98.8 (H) 96.0 - 97.0 %    Oxyhemoglobin 97.6 (H) 96.0 - 97.0 %    Base Excess/Deficit (+/-) 1.9   mmol/L    Sample Stabilized Temperature 37.0 degrees C   ECG 12-LEAD WITH MUSE (LHE)   Result Value Ref Range    Systolic Blood Pressure 157 mmHg    Diastolic Blood Pressure 107 mmHg    Ventricular Rate 147 BPM    Atrial Rate 147 BPM    NJ Interval 96 ms    QRS Duration 88 ms     ms    QTc 447 ms    P Axis 77 degrees    R AXIS 63 degrees    T Axis 40 degrees    Interpretation ECG Click View Image link to view waveform and result        RADIOLOGY:  Reviewed all pertinent imaging. Please see official radiology report.  CT Head w/o Contrast   Final Result   IMPRESSION:     1.  No evidence of acute intracranial hemorrhage or mass effect.      XR Chest Port 1 View   Final Result   IMPRESSION: ET tube 3.5 cm above the jessica. Chest otherwise negative. Lungs are expanded and clear.          EKG:    Performed at: 13:20    Impression: Sinus tachycardia. No ST elevation or depression.    Rate: 147 BPM  NJ Interval: 96 ms  QRS Interval: 88 ms  QTc Interval: 447 ms    I have independently reviewed and interpreted the EKG(s) documented above.    PROCEDURES:   PROCEDURE: Rapid Sequence Intubation:   INDICATIONS: Airway protection   PROCEDURE PROVIDER: : Kasia Ruiz   CONSENT:   Consent is implied given the emergent need.   PROCEDURE SPECIFIC CHECKLIST COMPLETED:   YES   TIME OUT: Universal protocol followed but time out not performed due to patient acuity.    MEDICATIONS: 10 mg etomidate, 100 mg succinylcholine   TUBE SIZE: 7.5   EQUIPMENT USED: Glidescope   POST-INTUBATION ASSESSMENT/NOTE: Difficulty of intubation - easy.     Following intubation the patient's breath sounds were equal.     ET Tube placement was confirmed with chest x-ray.    Oxygen saturations following intubation were 100%.    Monitoring consisted of : heart rate, cardiac monitor, continuous pulse oximetry, blood pressure checks, level of consciousness, IV access, constant attendance by RN, and MD attendance until patient stable or transfer of care   COMPLICATIONS: None.       I, Sobia Lawson, am serving as a scribe to document services personally performed by Dr. Kasia Ruiz based on my observation and the provider's statements to me. I, Kasia Ruiz MD attest that Sobia Lawson is acting in a scribe capacity, has observed my performance of the services and has documented them in accordance with my direction.    Kasia Ruiz M.D.  Emergency Medicine  [facility]       Kasia Ruiz MD  07/14/21 6290

## 2021-07-14 NOTE — PROGRESS NOTES
Patient's parent visiting and will be bringing all belongings home including meds and pocket knife.

## 2021-07-14 NOTE — ED TRIAGE NOTES
Carried to room 1 from triage. Friends brought pt in. Intoxicated. Unable to maintain airway. Sneezing only and coughing. Unresponsive to sternal rub. Nasal airway placed and began to bag pt and get ready for RSI. Blood sugar 130. HR 150s ST

## 2021-07-14 NOTE — PROGRESS NOTES
Patient was transported to ct and back to ED, on transport vent.  Patient tolerated well.  Transport was done with no complications.  Was placed back on ventilator on the following settings:    Ventilation Mode: CMV/AC  (Continuous Mandatory Ventilation/ Assist Control)  Rate Set (breaths/minute): 16 breaths/min  Tidal Volume Set (mL): 490 mL  PEEP (cm H2O): 5 cmH2O  Oxygen Concentration (%): 40 %  Resp: 22  ABG pending  Nabil Duran, RT

## 2021-07-14 NOTE — PROGRESS NOTES
Patient was transported to ICU from ER, on transport vent.  Patient tolerated well.  Transport was done with no complications.  Was placed back on ventilator on the following settings:    Ventilation Mode: CMV/AC  (Continuous Mandatory Ventilation/ Assist Control)  Rate Set (breaths/minute): (S) 14 breaths/min  Tidal Volume Set (mL): 490 mL  PEEP (cm H2O): 5 cmH2O  Oxygen Concentration (%): (S) 30 %  Resp: 14      Nabil Duran, RT

## 2021-07-15 LAB
ANION GAP SERPL CALCULATED.3IONS-SCNC: 13 MMOL/L (ref 5–18)
BUN SERPL-MCNC: 6 MG/DL (ref 8–22)
CALCIUM SERPL-MCNC: 8 MG/DL (ref 8.5–10.5)
CHLORIDE BLD-SCNC: 107 MMOL/L (ref 98–107)
CO2 SERPL-SCNC: 23 MMOL/L (ref 22–31)
CREAT SERPL-MCNC: 0.7 MG/DL (ref 0.7–1.3)
ERYTHROCYTE [DISTWIDTH] IN BLOOD BY AUTOMATED COUNT: 15.1 % (ref 10–15)
GFR SERPL CREATININE-BSD FRML MDRD: >90 ML/MIN/1.73M2
GLUCOSE BLD-MCNC: 86 MG/DL (ref 70–125)
GLUCOSE BLDC GLUCOMTR-MCNC: 103 MG/DL (ref 70–125)
GLUCOSE BLDC GLUCOMTR-MCNC: 114 MG/DL (ref 70–125)
HCT VFR BLD AUTO: 40.8 % (ref 40–53)
HGB BLD-MCNC: 13.7 G/DL (ref 13.3–17.7)
LACTATE SERPL-SCNC: 3 MMOL/L (ref 0.7–2)
MAGNESIUM SERPL-MCNC: 1.7 MG/DL (ref 1.8–2.6)
MAGNESIUM SERPL-MCNC: 1.8 MG/DL (ref 1.8–2.6)
MCH RBC QN AUTO: 29.4 PG (ref 26.5–33)
MCHC RBC AUTO-ENTMCNC: 33.6 G/DL (ref 31.5–36.5)
MCV RBC AUTO: 88 FL (ref 78–100)
PHOSPHATE SERPL-MCNC: 2.7 MG/DL (ref 2.5–4.5)
PLATELET # BLD AUTO: 206 10E3/UL (ref 150–450)
POTASSIUM BLD-SCNC: 3.4 MMOL/L (ref 3.5–5)
POTASSIUM BLD-SCNC: 4 MMOL/L (ref 3.5–5)
RBC # BLD AUTO: 4.66 10E6/UL (ref 4.4–5.9)
SODIUM SERPL-SCNC: 143 MMOL/L (ref 136–145)
WBC # BLD AUTO: 11.8 10E3/UL (ref 4–11)

## 2021-07-15 PROCEDURE — 250N000011 HC RX IP 250 OP 636: Performed by: NURSE PRACTITIONER

## 2021-07-15 PROCEDURE — 200N000001 HC R&B ICU

## 2021-07-15 PROCEDURE — 250N000013 HC RX MED GY IP 250 OP 250 PS 637: Performed by: NURSE PRACTITIONER

## 2021-07-15 PROCEDURE — 83735 ASSAY OF MAGNESIUM: CPT | Performed by: NURSE PRACTITIONER

## 2021-07-15 PROCEDURE — 36415 COLL VENOUS BLD VENIPUNCTURE: CPT | Performed by: INTERNAL MEDICINE

## 2021-07-15 PROCEDURE — 99232 SBSQ HOSP IP/OBS MODERATE 35: CPT | Mod: GC | Performed by: STUDENT IN AN ORGANIZED HEALTH CARE EDUCATION/TRAINING PROGRAM

## 2021-07-15 PROCEDURE — 80048 BASIC METABOLIC PNL TOTAL CA: CPT | Performed by: INTERNAL MEDICINE

## 2021-07-15 PROCEDURE — 83605 ASSAY OF LACTIC ACID: CPT | Performed by: NURSE PRACTITIONER

## 2021-07-15 PROCEDURE — 250N000009 HC RX 250: Performed by: INTERNAL MEDICINE

## 2021-07-15 PROCEDURE — 258N000003 HC RX IP 258 OP 636: Performed by: NURSE PRACTITIONER

## 2021-07-15 PROCEDURE — 250N000011 HC RX IP 250 OP 636: Performed by: INTERNAL MEDICINE

## 2021-07-15 PROCEDURE — 85027 COMPLETE CBC AUTOMATED: CPT | Performed by: INTERNAL MEDICINE

## 2021-07-15 PROCEDURE — 250N000009 HC RX 250: Performed by: NURSE PRACTITIONER

## 2021-07-15 PROCEDURE — 99233 SBSQ HOSP IP/OBS HIGH 50: CPT | Performed by: NURSE PRACTITIONER

## 2021-07-15 PROCEDURE — 36415 COLL VENOUS BLD VENIPUNCTURE: CPT | Performed by: STUDENT IN AN ORGANIZED HEALTH CARE EDUCATION/TRAINING PROGRAM

## 2021-07-15 PROCEDURE — 99232 SBSQ HOSP IP/OBS MODERATE 35: CPT | Performed by: NURSE PRACTITIONER

## 2021-07-15 PROCEDURE — 250N000013 HC RX MED GY IP 250 OP 250 PS 637: Performed by: INTERNAL MEDICINE

## 2021-07-15 PROCEDURE — 84132 ASSAY OF SERUM POTASSIUM: CPT | Performed by: STUDENT IN AN ORGANIZED HEALTH CARE EDUCATION/TRAINING PROGRAM

## 2021-07-15 RX ORDER — GABAPENTIN 300 MG/1
1200 CAPSULE ORAL ONCE
Status: COMPLETED | OUTPATIENT
Start: 2021-07-15 | End: 2021-07-15

## 2021-07-15 RX ORDER — DIAZEPAM 5 MG
10 TABLET ORAL EVERY 8 HOURS
Status: DISCONTINUED | OUTPATIENT
Start: 2021-07-15 | End: 2021-07-18 | Stop reason: HOSPADM

## 2021-07-15 RX ORDER — LORAZEPAM 2 MG/ML
1-2 INJECTION INTRAMUSCULAR EVERY 30 MIN PRN
Status: DISCONTINUED | OUTPATIENT
Start: 2021-07-15 | End: 2021-07-15

## 2021-07-15 RX ORDER — FOLIC ACID 1 MG/1
1 TABLET ORAL DAILY
Status: DISCONTINUED | OUTPATIENT
Start: 2021-07-16 | End: 2021-07-18 | Stop reason: HOSPADM

## 2021-07-15 RX ORDER — POTASSIUM CHLORIDE 1500 MG/1
40 TABLET, EXTENDED RELEASE ORAL ONCE
Status: DISCONTINUED | OUTPATIENT
Start: 2021-07-15 | End: 2021-07-15 | Stop reason: CLARIF

## 2021-07-15 RX ORDER — GABAPENTIN 300 MG/1
900 CAPSULE ORAL EVERY 8 HOURS
Status: COMPLETED | OUTPATIENT
Start: 2021-07-15 | End: 2021-07-18

## 2021-07-15 RX ORDER — GABAPENTIN 300 MG/1
600 CAPSULE ORAL EVERY 8 HOURS
Status: DISCONTINUED | OUTPATIENT
Start: 2021-07-18 | End: 2021-07-18 | Stop reason: HOSPADM

## 2021-07-15 RX ORDER — METOPROLOL TARTRATE 1 MG/ML
5 INJECTION, SOLUTION INTRAVENOUS EVERY 6 HOURS PRN
Status: DISCONTINUED | OUTPATIENT
Start: 2021-07-15 | End: 2021-07-18 | Stop reason: HOSPADM

## 2021-07-15 RX ORDER — POTASSIUM CHLORIDE 1500 MG/1
40 TABLET, EXTENDED RELEASE ORAL ONCE
Status: COMPLETED | OUTPATIENT
Start: 2021-07-15 | End: 2021-07-15

## 2021-07-15 RX ORDER — DIAZEPAM 5 MG
10 TABLET ORAL EVERY 30 MIN PRN
Status: DISCONTINUED | OUTPATIENT
Start: 2021-07-15 | End: 2021-07-18 | Stop reason: HOSPADM

## 2021-07-15 RX ORDER — CLONIDINE HYDROCHLORIDE 0.1 MG/1
0.1 TABLET ORAL EVERY 8 HOURS
Status: DISCONTINUED | OUTPATIENT
Start: 2021-07-15 | End: 2021-07-18 | Stop reason: HOSPADM

## 2021-07-15 RX ORDER — MULTIPLE VITAMINS W/ MINERALS TAB 9MG-400MCG
1 TAB ORAL DAILY
Status: DISCONTINUED | OUTPATIENT
Start: 2021-07-16 | End: 2021-07-18 | Stop reason: HOSPADM

## 2021-07-15 RX ORDER — LORAZEPAM 1 MG/1
1-2 TABLET ORAL EVERY 30 MIN PRN
Status: DISCONTINUED | OUTPATIENT
Start: 2021-07-15 | End: 2021-07-15

## 2021-07-15 RX ORDER — GABAPENTIN 300 MG/1
300 CAPSULE ORAL EVERY 8 HOURS
Status: DISCONTINUED | OUTPATIENT
Start: 2021-07-20 | End: 2021-07-18 | Stop reason: HOSPADM

## 2021-07-15 RX ORDER — DIAZEPAM 5 MG
10 TABLET ORAL EVERY 8 HOURS
Status: CANCELLED | OUTPATIENT
Start: 2021-07-15 | End: 2021-07-18

## 2021-07-15 RX ORDER — GABAPENTIN 300 MG/1
1200 CAPSULE ORAL ONCE
Status: DISCONTINUED | OUTPATIENT
Start: 2021-07-15 | End: 2021-07-15

## 2021-07-15 RX ORDER — DIAZEPAM 10 MG/2ML
5-10 INJECTION, SOLUTION INTRAMUSCULAR; INTRAVENOUS EVERY 30 MIN PRN
Status: DISCONTINUED | OUTPATIENT
Start: 2021-07-15 | End: 2021-07-18 | Stop reason: HOSPADM

## 2021-07-15 RX ORDER — GABAPENTIN 100 MG/1
100 CAPSULE ORAL EVERY 8 HOURS
Status: DISCONTINUED | OUTPATIENT
Start: 2021-07-22 | End: 2021-07-18 | Stop reason: HOSPADM

## 2021-07-15 RX ADMIN — GABAPENTIN 900 MG: 300 CAPSULE ORAL at 14:25

## 2021-07-15 RX ADMIN — THIAMINE HYDROCHLORIDE 100 MG: 100 INJECTION, SOLUTION INTRAMUSCULAR; INTRAVENOUS at 08:12

## 2021-07-15 RX ADMIN — FOLIC ACID: 5 INJECTION, SOLUTION INTRAMUSCULAR; INTRAVENOUS; SUBCUTANEOUS at 10:25

## 2021-07-15 RX ADMIN — LORAZEPAM 1 MG: 1 TABLET ORAL at 02:47

## 2021-07-15 RX ADMIN — POTASSIUM CHLORIDE 40 MEQ: 20 TABLET, EXTENDED RELEASE ORAL at 08:12

## 2021-07-15 RX ADMIN — LORAZEPAM 2 MG: 2 INJECTION INTRAMUSCULAR; INTRAVENOUS at 08:34

## 2021-07-15 RX ADMIN — CLONIDINE HYDROCHLORIDE 0.1 MG: 0.1 TABLET ORAL at 08:11

## 2021-07-15 RX ADMIN — SODIUM CHLORIDE 500 ML: 9 INJECTION, SOLUTION INTRAVENOUS at 08:43

## 2021-07-15 RX ADMIN — GABAPENTIN 1200 MG: 300 CAPSULE ORAL at 04:31

## 2021-07-15 RX ADMIN — DIAZEPAM 10 MG: 5 TABLET ORAL at 17:24

## 2021-07-15 RX ADMIN — LORAZEPAM 1 MG: 2 INJECTION INTRAMUSCULAR; INTRAVENOUS at 03:39

## 2021-07-15 RX ADMIN — LORAZEPAM 1 MG: 2 INJECTION INTRAMUSCULAR; INTRAVENOUS at 04:34

## 2021-07-15 RX ADMIN — GABAPENTIN 900 MG: 300 CAPSULE ORAL at 21:46

## 2021-07-15 RX ADMIN — CLONIDINE HYDROCHLORIDE 0.1 MG: 0.1 TABLET ORAL at 14:25

## 2021-07-15 RX ADMIN — DIAZEPAM 10 MG: 5 TABLET ORAL at 14:29

## 2021-07-15 RX ADMIN — DIAZEPAM 10 MG: 5 TABLET ORAL at 10:24

## 2021-07-15 RX ADMIN — PANTOPRAZOLE SODIUM 40 MG: 20 TABLET, DELAYED RELEASE ORAL at 08:12

## 2021-07-15 RX ADMIN — FOLIC ACID 1 MG: 5 INJECTION, SOLUTION INTRAMUSCULAR; INTRAVENOUS; SUBCUTANEOUS at 08:13

## 2021-07-15 RX ADMIN — DIAZEPAM 10 MG: 5 INJECTION, SOLUTION INTRAMUSCULAR; INTRAVENOUS at 11:57

## 2021-07-15 RX ADMIN — DIAZEPAM 10 MG: 5 TABLET ORAL at 21:46

## 2021-07-15 ASSESSMENT — ACTIVITIES OF DAILY LIVING (ADL): DEPENDENT_IADLS:: INDEPENDENT

## 2021-07-15 ASSESSMENT — MIFFLIN-ST. JEOR: SCORE: 1588.09

## 2021-07-15 NOTE — PLAN OF CARE
Problem: Adult Inpatient Plan of Care  Goal: Optimal Comfort and Wellbeing  Outcome: Improving  Intervention: Provide Person-Centered Care  Recent Flowsheet Documentation  Taken 7/15/2021 0000 by Eddy Meneses RN  Trust Relationship/Rapport:    care explained    emotional support provided    empathic listening provided    questions answered    questions encouraged    reassurance provided    thoughts/feelings acknowledged  Goal: Readiness for Transition of Care  Outcome: Improving     Problem: Alcohol Withdrawal  Goal: Alcohol Withdrawal Symptom Control  Outcome: Improving   Medicated patient with ativan total of 3 mg per CIWA protocol for CIWA scores of 11 x 3. Neurontin also given. Patient is sleeping at the moment.   Will continue to monitor.

## 2021-07-15 NOTE — CONSULTS
"    INITIAL PSYCHIATRIC CONSULTATION  This note was created with the help of Dragon dictation system. Grammatical and typing errors are not intentional.              REASON FOR REQUEST: acohol abuse with withdrawal      ASSESSMENT/RECOMMENDATIONS/PLAN :   Alcohol intoxication, withdrawal: Alcohol abuse disorder.  Anxiety reaction likely exacerbated in the context of above.  Substance-induced mood disorder in the context of alcoholism cannot be excluded.  Mood disorder NOS.  History of major depressive disorder.    Recommendations:  Patient is on tapering Neurontin dose continue.  Patient is on a scheduled Valium 10 mg every 8 hours for 9 doses.  Vistaril 50 mg 3 times a day for anxiety.  Consider Seroquel 25 mg 3 times a day as needed for anxiety.   to send referral to substance use disorder support program.  Case was discussed with the ICU provider.    MENTAL STATUS EXAMINATION:   General Appearance: Not in acute distress, resting comfortably in bed.    Behavior: Good eye contact, no bizarre ideations  Speech: Coherent.  Thought Process: Linear, clear.  Thought content: No evidence of hallucinations, delusions or paranoia.    Thought Formation: Associations are connected  Judgment: Fair  Insight : Intact  Attention : Adequate  Memory: Sufficient  Fund Of Knowledge: Average  Affect: Neutral  Mood: Congruent  Alert : Awake  Suicidal ideation: Absent  Homicidal ideation: Absent  Orientation: X 3  Comprehension: Sufficient   Generative thought content: Adequate.  Spontaneous conversation  Language: Intact  Gait and Ambulation: Gait and ambulation at baseline.    Musculoskeletal: No tonal abnormalities, tremulous, restless at times      /74   Pulse 109   Temp 98.1  F (36.7  C) (Oral)   Resp 15   Ht 1.702 m (5' 7\")   Wt 69.4 kg (153 lb 1.6 oz)   SpO2 98%   BMI 23.98 kg/m      HISTORY OF PRESENT ILLNESS:   Presenting history to include: Per [facility]:   Abhijit Saavedra is a 36 year old " "male with PMH significant for alcohol abuse disorder, MDD, anxiety who presented via friends for \"severe intoxication\" with AMS. Was intubated for difficulty maintaining airway and admitted to the ICU.   Given patient intubated and friend no longer present without number available, no further hx able to be assessed. Thus chart review from ED note used.Patient was seen in the ED 7/12/21 for evaluation of anxiety. Patient was feeling anxious after a stressful day of work and noticed his heart rate was in the 140's. He drank ~4 shots and a beer after being sober for 6 months. Patient given 5 mg valium for anxiety and was discharged home with his father will plans to attend an AA meeting.  The patient started taking Sildenafil 20 mg and then became altered, per the patient's co-workers.History is provided by friend    Upon assessment, patient was noted to be sleeping, arousable, engaged in a coherent and reliable conversation.  He endorsed consuming alcohol and having prior hospitalization as a result of this.  His history is significant for major depressive disorder, anxiety and alcohol use disorder.  He used to see a therapist but did not find that helpful.  He completed a chemical visit treatment 6 months ago, to partial avail.  He is not interested in any inpatient chemical dependency treatment at this time however open to seeking help with pursuing structured outpatient program.  He denies any hallucinations, delusions or paranoia.  Denies any bertha or bertha.  Reported of having fleeting thoughts of suicide without intent or plan, lasting \"less than 30 seconds\" and acknowledging \"that was probably because of his drinking\".  He lives with his parents and is planning to attend AA meetings.  He denies any thoughts of suicidality at this time.  Denies any suicidal ideation intent or plan.  He is future oriented.  Wants his anxiety manage when his hospital.  He is employed and would like to be discharged in order to " "continue with his employment.  He does not remember any recent psychiatric inpatient hospitalization.  Review of Systems:As per HPI. Remainders of 12 point review of systems negative.  Psychiatric ROS:  Change of Interest/Anhedonia:  No  Appetite/Weight Changes: No  Concentration Changes:No  Impaired Energy:No  Impaired Sleep:No  Anxiety/Panic: Yes  Tearfulness:No  Depression:No  Psychosis: No  Irritability:No  SI/VI/HI: No,No,No  Ledy: No              PFSH reviewed  and not pertinent to chief complaint/reason for visit  /86   Pulse 115   Temp 98.1  F (36.7  C) (Oral)   Resp 12   Ht 1.702 m (5' 7\")   Wt 69.4 kg (153 lb 1.6 oz)   SpO2 98%   BMI 23.98 kg/m    Ethanol g/dL (g/dL)   Date Value   12/18/2020 0.33 (HH)     Alcohol, Blood (mg/dL)   Date Value   07/14/2021 644 (H)     @24HOURRESULTS@  Recent Results (from the past 72 hour(s))   Extra Red Top Tube    Collection Time: 07/14/21  1:15 PM   Result Value Ref Range    Hold Specimen JIC    Extra Green Top (Lithium Heparin) Tube    Collection Time: 07/14/21  1:15 PM   Result Value Ref Range    Hold Specimen JIC    Extra Purple Top Tube    Collection Time: 07/14/21  1:15 PM   Result Value Ref Range    Hold Specimen JIC    Extra Green Top (Lithium Heparin) ON ICE    Collection Time: 07/14/21  1:15 PM   Result Value Ref Range    Hold Specimen JIC    Comprehensive metabolic panel    Collection Time: 07/14/21  1:15 PM   Result Value Ref Range    Sodium 145 136 - 145 mmol/L    Potassium 3.9 3.5 - 5.0 mmol/L    Chloride 103 98 - 107 mmol/L    Carbon Dioxide (CO2) 25 22 - 31 mmol/L    Anion Gap 17 5 - 18 mmol/L    Urea Nitrogen 10 8 - 22 mg/dL    Creatinine 0.79 0.70 - 1.30 mg/dL    Calcium 9.1 8.5 - 10.5 mg/dL    Glucose 140 (H) 70 - 125 mg/dL    Alkaline Phosphatase 132 (H) 45 - 120 U/L    AST 25 0 - 40 U/L    ALT 29 0 - 45 U/L    Protein Total 7.9 6.0 - 8.0 g/dL    Albumin 4.3 3.5 - 5.0 g/dL    Bilirubin Total 0.6 0.0 - 1.0 mg/dL    GFR Estimate >90 >60 " mL/min/1.73m2   Lactic acid whole blood    Collection Time: 07/14/21  1:15 PM   Result Value Ref Range    Lactic Acid 4.2 (HH) 0.7 - 2.0 mmol/L   Ethyl Alcohol Level    Collection Time: 07/14/21  1:15 PM   Result Value Ref Range    Alcohol, Blood 644 (H) None detected mg/dL   Acetaminophen level    Collection Time: 07/14/21  1:15 PM   Result Value Ref Range    Acetaminophen <3.0 (L) 10.0 - 20.0 ug/mL   Salicylate level    Collection Time: 07/14/21  1:15 PM   Result Value Ref Range    Salicylate <8 2 - 25 mg/dL   CBC with platelets and differential    Collection Time: 07/14/21  1:15 PM   Result Value Ref Range    WBC Count 12.6 (H) 4.0 - 11.0 10e3/uL    RBC Count 5.78 4.40 - 5.90 10e6/uL    Hemoglobin 17.1 13.3 - 17.7 g/dL    Hematocrit 50.2 40.0 - 53.0 %    MCV 87 78 - 100 fL    MCH 29.6 26.5 - 33.0 pg    MCHC 34.1 31.5 - 36.5 g/dL    RDW 15.0 10.0 - 15.0 %    Platelet Count 306 150 - 450 10e3/uL    % Neutrophils 62 %    % Lymphocytes 30 %    % Monocytes 7 %    % Eosinophils 1 %    % Basophils 0 %    % Immature Granulocytes 0 %    NRBCs per 100 WBC 0 <1 /100    Absolute Neutrophils 7.8 1.6 - 8.3 10e3/uL    Absolute Lymphocytes 3.8 0.8 - 5.3 10e3/uL    Absolute Monocytes 0.9 0.0 - 1.3 10e3/uL    Absolute Eosinophils 0.1 0.0 - 0.7 10e3/uL    Absolute Basophils 0.1 0.0 - 0.2 10e3/uL    Absolute Immature Granulocytes 0.1 (H) <=0.0 10e3/uL    Absolute NRBCs 0.0 10e3/uL   ECG 12-LEAD WITH MUSE (LHE)    Collection Time: 07/14/21  1:20 PM   Result Value Ref Range    Systolic Blood Pressure 157 mmHg    Diastolic Blood Pressure 107 mmHg    Ventricular Rate 147 BPM    Atrial Rate 147 BPM    FL Interval 96 ms    QRS Duration 88 ms     ms    QTc 447 ms    P Axis 77 degrees    R AXIS 63 degrees    T Axis 40 degrees    Interpretation ECG Click View Image link to view waveform and result    SARS-COV2 (COVID-19) Virus RT-PCR    Collection Time: 07/14/21  1:52 PM    Specimen: Nasopharyngeal; Swab   Result Value Ref Range     SARS CoV2 PCR Negative Negative   Blood gas arterial    Collection Time: 07/14/21  2:18 PM   Result Value Ref Range    pH Arterial 7.48 (H) 7.37 - 7.44    pCO2 Arterial 34 (L) 35 - 45 mm Hg    pO2 Arterial 112 (H) 80 - 90 mm Hg    Bicarbonate Arterial 27 23 - 29 mmol/L    O2 Sat, Arterial 98.8 (H) 96.0 - 97.0 %    Oxyhemoglobin 97.6 (H) 96.0 - 97.0 %    Base Excess/Deficit (+/-) 1.9   mmol/L    Sample Stabilized Temperature 37.0 degrees C   Lactic acid whole blood    Collection Time: 07/14/21  3:31 PM   Result Value Ref Range    Lactic Acid 3.9 (H) 0.7 - 2.0 mmol/L   Lactic acid whole blood    Collection Time: 07/14/21  7:37 PM   Result Value Ref Range    Lactic Acid 4.0 (HH) 0.7 - 2.0 mmol/L   Magnesium    Collection Time: 07/14/21 11:21 PM   Result Value Ref Range    Magnesium 1.7 (L) 1.8 - 2.6 mg/dL   Phosphorus    Collection Time: 07/14/21 11:21 PM   Result Value Ref Range    Phosphorus 2.7 2.5 - 4.5 mg/dL   Basic metabolic panel    Collection Time: 07/15/21  4:47 AM   Result Value Ref Range    Sodium 143 136 - 145 mmol/L    Potassium 3.4 (L) 3.5 - 5.0 mmol/L    Chloride 107 98 - 107 mmol/L    Carbon Dioxide (CO2) 23 22 - 31 mmol/L    Anion Gap 13 5 - 18 mmol/L    Urea Nitrogen 6 (L) 8 - 22 mg/dL    Creatinine 0.70 0.70 - 1.30 mg/dL    Calcium 8.0 (L) 8.5 - 10.5 mg/dL    Glucose 86 70 - 125 mg/dL    GFR Estimate >90 >60 mL/min/1.73m2   CBC with platelets    Collection Time: 07/15/21  4:47 AM   Result Value Ref Range    WBC Count 11.8 (H) 4.0 - 11.0 10e3/uL    RBC Count 4.66 4.40 - 5.90 10e6/uL    Hemoglobin 13.7 13.3 - 17.7 g/dL    Hematocrit 40.8 40.0 - 53.0 %    MCV 88 78 - 100 fL    MCH 29.4 26.5 - 33.0 pg    MCHC 33.6 31.5 - 36.5 g/dL    RDW 15.1 (H) 10.0 - 15.0 %    Platelet Count 206 150 - 450 10e3/uL   Lactic acid whole blood    Collection Time: 07/15/21  4:47 AM   Result Value Ref Range    Lactic Acid 3.0 (H) 0.7 - 2.0 mmol/L   Magnesium    Collection Time: 07/15/21  4:47 AM   Result Value Ref Range     Magnesium 1.8 1.8 - 2.6 mg/dL       PMH:   Past Medical History:   Diagnosis Date     Alcohol use      Obstructive sleep apnea            Current Medications:Scheduled Meds:    cloNIDine  0.1 mg Oral Q8H     diazepam  10 mg Oral Q8H     enoxaparin ANTICOAGULANT  40 mg Subcutaneous Q24H     [START ON 7/16/2021] folic acid  1 mg Oral Daily     folic acid  1 mg Intravenous Daily     [START ON 7/22/2021] gabapentin  100 mg Oral Q8H     [START ON 7/20/2021] gabapentin  300 mg Oral Q8H     [START ON 7/18/2021] gabapentin  600 mg Oral Q8H     gabapentin  900 mg Oral Q8H     [START ON 7/16/2021] multivitamin w/minerals  1 tablet Oral Daily     pantoprazole  40 mg Oral QAM AC    Or     pantoprazole  40 mg Per Feeding Tube QAM AC    Or     pantoprazole (PROTONIX) IV  40 mg Intravenous QAM AC     thiamine  100 mg Intravenous Daily     Continuous Infusions:  PRN Meds:.diazepam **OR** diazepam, flumazenil, haloperidol lactate, metoprolol, naloxone **OR** naloxone **OR** naloxone **OR** naloxone, ondansetron, prochlorperazine                Family History: PERSONALLY REVIEWED.No family history on file.  Pertinent Family hx not pertinent to Chief Complaint or reason for visit.     Social History:  PERSONALLY REVIEWED.  Social History     Socioeconomic History     Marital status: Single     Spouse name: Not on file     Number of children: Not on file     Years of education: Not on file     Highest education level: Not on file   Occupational History     Not on file   Tobacco Use     Smoking status: Never Smoker     Smokeless tobacco: Never Used   Substance and Sexual Activity     Alcohol use: Yes     Alcohol/week: 10.0 standard drinks     Types: 10 Shots of liquor per week     Comment: heavily, daily      Drug use: Never     Sexual activity: Not on file   Other Topics Concern     Not on file   Social History Narrative     Not on file     Social Determinants of Health     Financial Resource Strain:      Difficulty of Paying Living  Expenses:    Food Insecurity:      Worried About Running Out of Food in the Last Year:      Ran Out of Food in the Last Year:    Transportation Needs:      Lack of Transportation (Medical):      Lack of Transportation (Non-Medical):    Physical Activity:      Days of Exercise per Week:      Minutes of Exercise per Session:    Stress:      Feeling of Stress :    Social Connections:      Frequency of Communication with Friends and Family:      Frequency of Social Gatherings with Friends and Family:      Attends Taoism Services:      Active Member of Clubs or Organizations:      Attends Club or Organization Meetings:      Marital Status:    Intimate Partner Violence:      Fear of Current or Ex-Partner:      Emotionally Abused:      Physically Abused:      Sexually Abused:     not pertinent to Chief Complaint or reason for visit.             Allergies as of 06/01/2014 Reviewed     Review of Systems:As per HPI. Remainders of 12 point review of systems negative.    Review of Pertinent Laboratory:      PERSONALLY REVIEWED.    Physical Exam: Temp:  [96.9  F (36.1  C)-98.5  F (36.9  C)] 98.1  F (36.7  C)  Pulse:  [] 115  Resp:  [5-50] 12  BP: ()/() 126/86  FiO2 (%):  [21 %] 21 %  SpO2:  [92 %-100 %] 98 %   Vitals: reviewed in chart     Physical exam as per medical team: reviewed in chart      diagnoses, risk and benefits of medications discussed with staff. Care coordination with care management team.   Thank you for this consultation.       Ale Paige; NP  [facility]        This note was created with the help of Dragon dictation system. Grammatical and typing errors are not intentional.

## 2021-07-15 NOTE — CONSULTS
Care Management Initial Consult    General Information  Assessment completed with: Patient, Family, pt, sister  Type of CM/SW Visit: Initial Assessment    Primary Care Provider verified and updated as needed:   Clinical progression, psych consult  Readmission within the last 30 days:        Reason for Consult: discharge planning, mental health concerns, psychosocial concerns, substance use concerns  Advance Care Planning:            Communication Assessment  Patient's communication style: spoken language (English or Bilingual)    Hearing Difficulty or Deaf: no   Wear Glasses or Blind: no    Cognitive  Cognitive/Neuro/Behavioral: orientation        Orientation: oriented x 4 (some forgetfulness)             Living Environment:   People in home: parent(s)  mother and father  Current living Arrangements: house      Able to return to prior arrangements: yes       Family/Social Support:  Care provided by: self  Provides care for: no one  Marital Status: Single  Sibling(s), Parent(s)          Description of Support System: Supportive    Support Assessment: Adequate family and caregiver support    Current Resources:   Patient receiving home care services: No     Community Resources:    Equipment currently used at home: none  Supplies currently used at home: None    Employment/Financial:  Employment Status: employed full-time     Employment/ Comments:  ()  Financial Concerns:             Lifestyle & Psychosocial Needs:  Social Determinants of Health     Tobacco Use: Low Risk      Smoking Tobacco Use: Never Smoker     Smokeless Tobacco Use: Never Used   Alcohol Use:      Frequency of Alcohol Consumption:      Average Number of Drinks:      Frequency of Binge Drinking:    Financial Resource Strain:      Difficulty of Paying Living Expenses:    Food Insecurity:      Worried About Running Out of Food in the Last Year:      Ran Out of Food in the Last Year:    Transportation Needs:      Lack of  "Transportation (Medical):      Lack of Transportation (Non-Medical):    Physical Activity:      Days of Exercise per Week:      Minutes of Exercise per Session:    Stress:      Feeling of Stress :    Social Connections:      Frequency of Communication with Friends and Family:      Frequency of Social Gatherings with Friends and Family:      Attends Restorationism Services:      Active Member of Clubs or Organizations:      Attends Club or Organization Meetings:      Marital Status:    Intimate Partner Violence:      Fear of Current or Ex-Partner:      Emotionally Abused:      Physically Abused:      Sexually Abused:    Depression:      PHQ-2 Score:    Housing Stability:      Unable to Pay for Housing in the Last Year:      Number of Places Lived in the Last Year:      Unstable Housing in the Last Year:        Functional Status:  Prior to admission patient needed assistance:   Dependent ADLs:: Independent  Dependent IADLs:: Independent       Mental Health Status:  Mental Health Status: Current Concern       Chemical Dependency Status:  Chemical Dependency Status: Current Concern  Chemical Dependency Management: Previous treatment         Additional Information:  FRANCOISECM completed chart review. SW met with patient and his sister in his room, patient able to participate some, gave verbal consent for SW to complete assessment with sister, pt states \"I am an open book.\"  Pt currently lives at his parent's home, mother and father . Pt's sister is very involved and supportive. Pt has a full time job which he is a  at a [employer]. Per patient, this job has contributed to increased stress and sister reports pt recently experienced a panic attack related to his relapse 5 to 6 days ago.  Patient went to [facility] in patient treatment for 30 days in [place] in January 2021, and a 28 day residential treatment through [facility] in [facility] following. Pt then completed intensive outpatient treatment in [place].  Pt endorses " increased drinking following a breakup 2.5 years ago.   Pt reports he has been to mental health treatment with a therapist in the past which had not been helpful.   Pt reports good support from his sister and brother in law. Pt's sister reports that patient's parents drink at home.   At this time, discharge disposition to be determined pending patient's goals. Sister reports pt has insurance through his previous employer ([employer]).   Discussed SUDS (substance use disorder support ) program with patient's sister, will review program with pt when he is able to sign consent form and ELSA.  Pt states he is agreeable to mental health treatment following hospitalization, SW will assist in referral.     China Beck, ELDA

## 2021-07-15 NOTE — PROGRESS NOTES
Critical Care Progress Note  7/15/2021   10:36 AM    Admit Date: 7/14/2021  ICU Admission Day: 2  Code Status: Full code      Problem List:   Active Problems:    Altered mental status, unspecified altered mental status type      Major changes for today:    - stop precedex   - schedule valium    - Psych consult     Plan by System:     Neuro/Psych: Acute EtOH intoxication with EtOH level of 640 on presentation; obtunded on arrival. No active withdrawal yet.    - High risk for acute withdrawal. Continue CIWA monitoring.    - Schedule Valium 10mg Q8h in addition to symptom triggered dosing.    - Precedex off.    - Psych to see; he is saying he needs to leave in 24-hours.    - Previously went through treament 6-months ago.     Pulmonary: No acute issues; initially intubated for airway protection due to obtundation from EtOH intoxication.    - Self extubated overnight night and doing well on NC.    - Routine pulmonary hygien.     CV: Tachycardia secondary to alcohol - he tells me his heart has been going this fast for the last year or so because of his anxiety.    - Clonidine scheduled per CIWA protocol.    - PRN Metoprolol available for hypertension.    - Will give fluid bolus this morning.     GI/: No acute issues.    - Diet: Regular   - Last BM: PTA    - Bowel meds: not indicated.    - Remove sheth     Renal: no acute issues    ID: no acute issues    Heme/Coag:  No acute issues    - DVT proph: start Enoxaparin.     Endocrine: no acute issues     Family: sister at the bedside today.        Dispo: possible transfer this afternoon if ok off precedex.     Sejal Green, CNP  [facility]     _______________________________________________________________    HPI: 36 year old male with history of ETOH abuse presented with friends concerned for his unresponsiveness. Immediately intubated in ED for airway protection. ETOH >600, no additional tox positive. Started becoming dyssynchronous with  vent as started to wake up but resedated. Self extubated overnight.     ROS: throat and stomach hurt. Back hurts from bed being uncomfortable. Feels a lot of shame and regret this morning.     SubjTells me he went through rehab 6-months ago. Has a lot of anxiety about his job and started drinking. Says he cannot do a 72-hour hold this time; has to leave in 24-hours.     Events over last 24-hours: as above.     Objective:     Vitals:    07/15/21 0730 07/15/21 0745 07/15/21 0800 07/15/21 0830   BP: 124/66 123/76 127/76 126/86   BP Location:   Left arm    Pulse: 111 101 100 115   Resp: 12 21 14 12   Temp:   98.1  F (36.7  C)    TempSrc:   Oral    SpO2: 99% 99% 100% 98%   Weight:       Height:           Vent:   Day of Intubation: 7/14       I/O:     Intake/Output Summary (Last 24 hours) at 7/15/2021 1036  Last data filed at 7/15/2021 1000  Gross per 24 hour   Intake 3371.13 ml   Output 4080 ml   Net -708.87 ml     Wt Readings from Last 3 Encounters:   07/15/21 69.4 kg (153 lb 1.6 oz)   07/12/21 70.3 kg (155 lb)      Weight change:     Physical Exam:  Gen: awake and alert  HEENMT: AT/NC  NEURO: nonfocal   CARDIOVASCULAR: Tachy and regular  PULMONARY: chest clear to auscultation and no tachypnea, retractions or cyanosis  GASTROINTESTINAL: soft ntnd  INTEGUME NT: Visible skin intact; a bit reddened   PSYCH: alert and oriented     Labs:   Recent Labs   Lab 07/15/21  0447 07/14/21  1315    145   CO2 23 25   BUN 6* 10   ALKPHOS  --  132*   ALT  --  29   AST  --  25       Recent Labs   Lab 07/15/21  0447   WBC 11.8*   HGB 13.7   HCT 40.8          Micro:   None this admission    Imaging: all imaging personalized reviewed   7/14 CXR - ET tube 3.5 cm above the jessica. Chest otherwise negative. Lungs are expanded and clear    7/14 CT Head - FINDINGS:  INTRACRANIAL CONTENTS: No evidence of acute intracranial hemorrhage or mass effect. Brain attenuation morphology are normal. The ventricles and sulci are normal for age.  Normal gray-white matter differentiation. The basilar cisterns are patent.     VISUALIZED ORBITS/SINUSES/MASTOIDS: The globes are unremarkable. The partially imaged paranasal sinuses, mastoid air cells and middle ear cavities are unremarkable.      BONES/SOFT TISSUES: The visualized skull base and calvarium are unremarkable.        Sejal Green, [facility]/Critical Care

## 2021-07-15 NOTE — PROGRESS NOTES
Patient did 10 minute SBT, was 80% apneic, was going to let patient wake up more before trying again. Approximately 10 minutes later patient was able to sit up and lean forward in bed, pulled out his ETT tube at 2245. Airway was suctioned, minimal secretions, airway patent, 1LNC end tidal CO2. Patient is still drowsy, needing minimal stimulation/talking, to prevent apnea.     Patient mother was updated, his sister will be here in morning, and mother in the evening.

## 2021-07-15 NOTE — PROGRESS NOTES
"Primary Medicine Progress Note    Assessment/Plan  Active Problems:    Altered mental status, unspecified altered mental status type    Abhijit Saavedra is a 36 year old male with PMH significant for alcohol abuse disorder who presented via friends for \"severe intoxication\" with AMS. Was intubated for difficulty maintaining airway and admitted to the ICU but self extubated after several hours. Improved overall on 2L NC but currently remains managed by ICU given concern for potential withdrawal.        Severe alcohol intoxication with AMS, intubated  , no other ingestions on panel, UDS negative. Was severely altered and concern for airway protection, thus intubated/sedated. ABG after intubation 7.48/34/112/27. Lactic acid 4.2 -> 3.9 after 1L NS bolus. CT head normal.  Self extubated self several hours after admission. Stable now on 2L NC. Getting scheduled valium/gabapentin. If doing well and low concern of significant withdrawal, may transfer out of ICU.  PLAN:  -2L NC currently, maintain spO2 > 90%  -PPI  -CIWA protocol, off precedex. Scheduled valium and gabapentin  -Psych consult  - consult  -Folate/thiamine  -SCDs, lovenox       Dispo: inpatient status for alcohol intoxication s/p intubation, anticipate discharge to home   Barriers to discharge:  work up in progress   Anticipated discharge date:  multiple more days    Diet: Orders Placed This Encounter      Clear Liquid Diet  DVT Prophylaxis: Enoxaparin (Lovenox) SQ and Pneumatic Compression Devices  Fluids: none  Central Lines: None  Code Status: Prior, per ICU      Subjective:   Last night pulled out ETT. Now with sister in room. Was irritated early, sister helps calm him down.      Objective    Vital signs in last 24 hours Temp:  [96.9  F (36.1  C)-98.5  F (36.9  C)] 98.3  F (36.8  C)  Pulse:  [] 115  Resp:  [5-50] 13  BP: ()/() 115/72  FiO2 (%):  [21 %] 21 %  SpO2:  [92 %-100 %] 100 %       Weight:   Vitals:    07/14/21 " 1715 07/15/21 0400   Weight: 72.8 kg (160 lb 6.4 oz) 69.4 kg (153 lb 1.6 oz)       Intake/Output last 3 shift I/O last 3 completed shifts:  In: 3121.13 [P.O.:720; I.V.:1401.13; IV Piggyback:1000]  Out: 3650 [Urine:3650]    Intake/Output this shift:No intake/output data recorded.    PHYSICAL EXAM  GENERAL APPEARANCE: Not in distress, sitting up in bed  HEENT: Moist mucous membranes  LUNGS: 2L NC, not in respiratory distress  HEART: Well perfused  ABDOMEN: Non-distended  EXTREMITIES: Grossly misha    Pertinent Labs and Pertinent Radiology   Lab Results: personally reviewed.     Recent Labs   Lab 07/14/21  1315   WBC 12.6*   HGB 17.1   HCT 50.2          Recent Labs   Lab 07/15/21  0447 07/14/21  1315    145   CO2 23 25   BUN 6* 10   ALBUMIN  --  4.3   ALKPHOS  --  132*   ALT  --  29   AST  --  25       Radiology Results:   Results for orders placed or performed during the hospital encounter of 07/14/21   CT Head w/o Contrast    Impression    IMPRESSION:    1.  No evidence of acute intracranial hemorrhage or mass effect.   XR Chest Port 1 View    Impression    IMPRESSION: ET tube 3.5 cm above the jessica. Chest otherwise negative. Lungs are expanded and clear.       EKG Results: personally reviewed.       Precepted patient with Dr. Vishnu Mcwilliams MD  [facility] Resident   Pager #: [phone]    Parts of this note were created with the assistance of voice recognition software.  The note was reviewed for accuracy.  However, errors in spelling, etc may have resulted.

## 2021-07-15 NOTE — PLAN OF CARE
Problem: Adult Inpatient Plan of Care  Goal: Plan of Care Review  Outcome: Improving     Problem: Adult Inpatient Plan of Care  Goal: Patient-Specific Goal (Individualized)  Outcome: Improving     Problem: Adult Inpatient Plan of Care  Goal: Absence of Hospital-Acquired Illness or Injury  Intervention: Prevent Skin Injury  Recent Flowsheet Documentation  Taken 7/14/2021 2000 by Nedra Gordon RN  Body Position:   log-rolled   supine, head elevated  Taken 7/14/2021 1800 by Nedra Gordon, RN  Body Position:   log-rolled   supine, head elevated  Taken 7/14/2021 1700 by Nedra Gordon, RN  Body Position:   log-rolled   supine, head elevated  Taken 7/14/2021 1545 by Nedra Gordon, RN  Body Position:   log-rolled   supine, head elevated    Patient Self extubated after reducing sedation and a 10 minute SBT with too frequent apnea.

## 2021-07-15 NOTE — PROGRESS NOTES
Received PT on full vent support and shifted to CPAP/PSV at 22:30 for wean to extubate process.Patient could tolerated well on PSV due to frequent apnea. The ICU Dr was notified and PT was placed back on full vent support at 22;40.  At 22:50, Patient pulled the ETT out by himself. Patient was sittings in bed and talking, No respiratory distress or SOB noted at this time. Patient placed on 10L Oxymask and satting 100%, B/S good air entry bilaterally.    RT will continue to follow PT's Respiratory Status.    Hillary Byrd

## 2021-07-16 LAB — MAGNESIUM SERPL-MCNC: 1.9 MG/DL (ref 1.8–2.6)

## 2021-07-16 PROCEDURE — 999N000127 HC STATISTIC PERIPHERAL IV START W US GUIDANCE

## 2021-07-16 PROCEDURE — 250N000011 HC RX IP 250 OP 636: Performed by: INTERNAL MEDICINE

## 2021-07-16 PROCEDURE — 250N000013 HC RX MED GY IP 250 OP 250 PS 637: Performed by: NURSE PRACTITIONER

## 2021-07-16 PROCEDURE — 250N000013 HC RX MED GY IP 250 OP 250 PS 637: Performed by: INTERNAL MEDICINE

## 2021-07-16 PROCEDURE — 200N000001 HC R&B ICU

## 2021-07-16 PROCEDURE — 250N000011 HC RX IP 250 OP 636: Performed by: NURSE PRACTITIONER

## 2021-07-16 PROCEDURE — 99232 SBSQ HOSP IP/OBS MODERATE 35: CPT | Mod: GC | Performed by: STUDENT IN AN ORGANIZED HEALTH CARE EDUCATION/TRAINING PROGRAM

## 2021-07-16 PROCEDURE — 83735 ASSAY OF MAGNESIUM: CPT | Performed by: NURSE PRACTITIONER

## 2021-07-16 PROCEDURE — 36415 COLL VENOUS BLD VENIPUNCTURE: CPT | Performed by: NURSE PRACTITIONER

## 2021-07-16 RX ORDER — ACAMPROSATE CALCIUM 333 MG/1
666 TABLET, DELAYED RELEASE ORAL 3 TIMES DAILY
Status: ON HOLD | COMMUNITY
End: 2021-07-18

## 2021-07-16 RX ADMIN — GABAPENTIN 900 MG: 300 CAPSULE ORAL at 06:37

## 2021-07-16 RX ADMIN — DIAZEPAM 10 MG: 5 TABLET ORAL at 09:30

## 2021-07-16 RX ADMIN — CLONIDINE HYDROCHLORIDE 0.1 MG: 0.1 TABLET ORAL at 13:33

## 2021-07-16 RX ADMIN — DIAZEPAM 10 MG: 5 TABLET ORAL at 02:13

## 2021-07-16 RX ADMIN — THIAMINE HYDROCHLORIDE 100 MG: 100 INJECTION, SOLUTION INTRAMUSCULAR; INTRAVENOUS at 08:31

## 2021-07-16 RX ADMIN — FOLIC ACID 1 MG: 1 TABLET ORAL at 08:31

## 2021-07-16 RX ADMIN — CLONIDINE HYDROCHLORIDE 0.1 MG: 0.1 TABLET ORAL at 22:24

## 2021-07-16 RX ADMIN — CLONIDINE HYDROCHLORIDE 0.1 MG: 0.1 TABLET ORAL at 06:37

## 2021-07-16 RX ADMIN — PANTOPRAZOLE SODIUM 40 MG: 20 TABLET, DELAYED RELEASE ORAL at 06:37

## 2021-07-16 RX ADMIN — Medication 1 TABLET: at 08:31

## 2021-07-16 RX ADMIN — GABAPENTIN 900 MG: 300 CAPSULE ORAL at 22:24

## 2021-07-16 RX ADMIN — ENOXAPARIN SODIUM 40 MG: 40 INJECTION SUBCUTANEOUS at 13:34

## 2021-07-16 RX ADMIN — DIAZEPAM 10 MG: 5 TABLET ORAL at 18:34

## 2021-07-16 RX ADMIN — GABAPENTIN 900 MG: 300 CAPSULE ORAL at 13:33

## 2021-07-16 NOTE — PROGRESS NOTES
Care Management Follow Up    Length of Stay (days): 2    Expected Discharge Date:  7/17/21       Concerns to be Addressed:     Clinical progression  Patient plan of care discussed at interdisciplinary rounds: Yes    Anticipated Discharge Disposition:  Home with parents     Anticipated Discharge Services:  Outpatient CD treatment  Anticipated Discharge DME:  N/A  Education Provided on the Discharge Plan:  yes  Patient/Family in Agreement with the Plan:  yes    Referrals Placed by CM/SW:  SUDS program  Private pay costs discussed: Not applicable    Additional Information:  SWCM met with patient in his room, mother present. Pt okay with mother remaining in room.   Patient plans to go to outpatient CD treatment - would like to go to [facility] where he has been before, mother is currently on phone assisting with process. Pt cognizant of his employer allowing him 30 days off to keep job and return to job.   SW provided pt information on SUDS program for support, pt is interested, signed consent form and ELSA. SW faxed referral to [facility].   Patient and mother deny additional needs at this time, provided SW contact information for weekend for additional questions.    China Beck, EMMASW

## 2021-07-16 NOTE — PROGRESS NOTES
"Primary Medicine Progress Note    Assessment/Plan  Active Problems:    Altered mental status, unspecified altered mental status type      Abhijit Saavedra is a 36 year old male with PMH significant for alcohol abuse disorder who presented via friends for \"severe intoxication\" with AMS. Was intubated for difficulty maintaining airway and admitted to the ICU but self extubated after several hours.  No longer requiring any respiratory support,  but still with concern for withdrawal.  Will transfer to floor when bed becomes available is no longer needing ICU care.  Plan to discharge tomorrow if looking good.    Severe alcohol intoxication with AMS, now with alcohol withdrawal  , no other ingestions on panel, UDS negative. Was severely altered and concern for airway protection, thus intubated/sedated. ABG after intubation 7.48/34/112/27. Lactic acid 4.2 -> 3.9 after 1L NS bolus. CT head normal.  Self extubated self several hours after admission.  Has been receiving scheduled valium/gabapentin.  Recent CIWA's have been 3.   -Psych consult  - consult  -CIWA protocol; scheduled valium & gabapentin  -Folate/thiamine/multivitamin  -SCDs, lovenox    Anxiety  Hx of anxiety, worse lately because of stress with job and girlfriend breaking up with him.  -Psych consult   -Vistaril 50mg TID  -Consider seroquel 25mg prn TID        Dispo: inpatient status for alcohol intoxication s/p intubation, anticipate discharge to home   Barriers to discharge:  Medications and monitoring for alcohol withdrawal  Anticipated discharge date:  1 day     Diet: Orders Placed This Encounter      Clear Liquid Diet  DVT Prophylaxis: Enoxaparin (Lovenox) SQ and Pneumatic Compression Devices  Fluids: none  Central Lines: None  Code Status: Prior, per ICU    Diet: Orders Placed This Encounter      Regular Diet Adult  DVT Prophylaxis: Enoxaparin (Lovenox) SQ and Pneumatic Compression Devices  Fluids: none  Central Lines: None  Code Status: " Full Code      Subjective:   Says he has some chest pain related to the self extubation.  Is anxious and feels restricted to his room.  Wants to know if he is able to get up and walk around.  Is amenable to staying another day for alcohol withdrawal.  Is concerned about his job and is girlfriend, states he really wants her to come visit him but does not know what the hospital policies are.  Denies feeling tremulous, or disoriented.      Objective    Vital signs in last 24 hours Temp:  [98.1  F (36.7  C)-98.7  F (37.1  C)] 98.4  F (36.9  C)  Pulse:  [] 84  Resp:  [10-29] 29  BP: (113-129)/(66-84) 117/83  SpO2:  [93 %-98 %] 97 %       Weight:   Vitals:    07/14/21 1715 07/15/21 0400   Weight: 72.8 kg (160 lb 6.4 oz) 69.4 kg (153 lb 1.6 oz)       Intake/Output last 3 shift I/O last 3 completed shifts:  In: 2380 [P.O.:1370; I.V.:1010]  Out: 1280 [Urine:1280]    Intake/Output this shift:I/O this shift:  In: 240 [P.O.:240]  Out: -     PHYSICAL EXAM  GENERAL APPEARANCE: Not in distress, cooperative; appears stated age.  Lying comfortably supine in bed  LUNGS: Lungs CTA  HEART: RRR, no murmurs. Radial pulses 2+. Brisk cap refill  ABDOMEN: Non-distended, soft.  No tenderness  EXTREMITIES: Grossly normal without deformity, no edema  PSYCH: Slightly anxious, with out pressured speech and normal rate and volume  NEURO: Oriented to time; horizontal nystagmus present.  Pupils symmetric.  No tremor or asterixis    Pertinent Labs and Pertinent Radiology   Lab Results: personally reviewed.     Recent Labs   Lab 07/15/21  0447   WBC 11.8*   HGB 13.7   HCT 40.8          Recent Labs   Lab 07/15/21  0447 07/14/21  1315    145   CO2 23 25   BUN 6* 10   ALBUMIN  --  4.3   ALKPHOS  --  132*   ALT  --  29   AST  --  25       Radiology Results:   Results for orders placed or performed during the hospital encounter of 07/14/21   CT Head w/o Contrast    Impression    IMPRESSION:    1.  No evidence of acute intracranial  hemorrhage or mass effect.   XR Chest Port 1 View    Impression   2 IMPRESSION: ET tube 3.5 cm above the jessica. Chest otherwise negative. Lungs are expanded and clear.        EKG Results: personally reviewed.       Precepted patient with Dr. Megha Mcwilliams MD  [facility] Resident   Pager #: [phone]    Parts of this note were created with the assistance of voice recognition software.  The note was reviewed for accuracy.  However, errors in spelling, etc may have resulted.

## 2021-07-16 NOTE — PHARMACY-ADMISSION MEDICATION HISTORY
Pharmacy Note - Admission Medication History    Pertinent Provider Information: none     ______________________________________________________________________    Prior To Admission (PTA) med list completed and updated in EMR.       Prior to Admission Medications   Prescriptions Last Dose Informant Patient Reported? Taking?   acamprosate (CAMPRAL) 333 MG EC tablet Past Week at Unknown time  Yes Yes   Sig: Take 666 mg by mouth 3 times daily      Facility-Administered Medications: None       [facility]  Method of interview communication: in-person    Summary of Changes to PTA Med List  New: none  Discontinued: none  Changed: none    Patient was asked about OTC/herbal products specifically.  PTA med list reflects this.    In the past week, patient estimated taking medication this percent of the time:  50-90% due to other.    Allergies were reviewed, assessed, and updated with the patient.      Patient does not use any multi-dose medications prior to admission.    The information provided in this note is only as accurate as the sources available at the time of the update(s).    Thank you for the opportunity to participate in the care of this patient.    Gregory Kelly RPH  7/16/2021 10:13 AM

## 2021-07-16 NOTE — PROGRESS NOTES
"CLINICAL NUTRITION SERVICES - ASSESSMENT NOTE     Nutrition Prescription    RECOMMENDATIONS FOR MDs/PROVIDERS TO ORDER:    Malnutrition Status:    Pt does not qualify with intentional wt loss with diet changes and exercise         REASON FOR ASSESSMENT  Abhijit Saavedra is a/an 36 year old male assessed by the dietitian for Admission Nutrition Risk Screen for positive wt loss.    NUTRITION HISTORY  Pt reports wt gain at treatment as he was eating well, his wt went from 150s to 190s.  He then went to further treatment and intentionally worked on losing wt by making healthy diet changes and exercising.       CURRENT NUTRITION ORDERS  Diet: Regular  Intake/Tolerance: Good intake per pt    LABS  Labs reviewed    MEDICATIONS  Medications reviewed    ANTHROPOMETRICS  Height: 170.2 cm (5' 7\")  Most Recent Weight: 69.4 kg (153 lb 1.6 oz)    IBW: 148 lb+-10%  -158 lb  Weight History:   Wt Readings from Last 6 Encounters:   07/15/21 69.4 kg (153 lb 1.6 oz)   07/12/21 70.3 kg (155 lb)   193 lb 11/5/20, 185 lb 12/22/20  Intentional wt loss per pt.    Dosing Weight: 69 kg    ASSESSED NUTRITION NEEDS  Estimated Energy Needs: 1725+ kcals/day (25+)  Justification: Maintenance  Estimated Protein Needs: 69+ grams protein/day (1+)  Justification: Maintenance  Estimated Fluid Needs: 1725+ mL/day (1 mL/kcal)   Justification: Maintenance    PHYSICAL FINDINGS  See malnutrition section below.  No wasting noted.     MALNUTRITION  % Intake: No decreased intake noted  % Weight Loss: intentional 40 lb wt loss in 7 months  Subcutaneous Fat Loss: None observed  Muscle Loss: None observed  Fluid Accumulation/Edema: None noted  Malnutrition Diagnosis: Patient does not meet two of the established criteria necessary for diagnosing malnutrition    NUTRITION DIAGNOSIS  No nutrition diagnosis at this time     INTERVENTIONS  Implementation  Continue diet as ordered        Goals  Patient to consume % of nutritionally adequate meal trays TID, " or the equivalent with supplements/snacks.     Monitoring/Evaluation  Progress toward goals will be monitored and evaluated per protocol.

## 2021-07-17 PROCEDURE — 99232 SBSQ HOSP IP/OBS MODERATE 35: CPT | Mod: GC | Performed by: STUDENT IN AN ORGANIZED HEALTH CARE EDUCATION/TRAINING PROGRAM

## 2021-07-17 PROCEDURE — 250N000011 HC RX IP 250 OP 636: Performed by: INTERNAL MEDICINE

## 2021-07-17 PROCEDURE — 250N000013 HC RX MED GY IP 250 OP 250 PS 637: Performed by: NURSE PRACTITIONER

## 2021-07-17 PROCEDURE — 250N000011 HC RX IP 250 OP 636: Performed by: NURSE PRACTITIONER

## 2021-07-17 PROCEDURE — 250N000013 HC RX MED GY IP 250 OP 250 PS 637: Performed by: INTERNAL MEDICINE

## 2021-07-17 PROCEDURE — 200N000001 HC R&B ICU

## 2021-07-17 RX ADMIN — FOLIC ACID 1 MG: 1 TABLET ORAL at 09:31

## 2021-07-17 RX ADMIN — GABAPENTIN 900 MG: 300 CAPSULE ORAL at 21:32

## 2021-07-17 RX ADMIN — GABAPENTIN 900 MG: 300 CAPSULE ORAL at 13:45

## 2021-07-17 RX ADMIN — Medication 1 TABLET: at 09:31

## 2021-07-17 RX ADMIN — THIAMINE HYDROCHLORIDE 100 MG: 100 INJECTION, SOLUTION INTRAMUSCULAR; INTRAVENOUS at 09:31

## 2021-07-17 RX ADMIN — ENOXAPARIN SODIUM 40 MG: 40 INJECTION SUBCUTANEOUS at 11:31

## 2021-07-17 RX ADMIN — DIAZEPAM 5 MG: 5 TABLET ORAL at 21:39

## 2021-07-17 RX ADMIN — CLONIDINE HYDROCHLORIDE 0.1 MG: 0.1 TABLET ORAL at 05:43

## 2021-07-17 RX ADMIN — CLONIDINE HYDROCHLORIDE 0.1 MG: 0.1 TABLET ORAL at 21:32

## 2021-07-17 RX ADMIN — GABAPENTIN 900 MG: 300 CAPSULE ORAL at 05:43

## 2021-07-17 RX ADMIN — DIAZEPAM 10 MG: 5 TABLET ORAL at 02:02

## 2021-07-17 RX ADMIN — CLONIDINE HYDROCHLORIDE 0.1 MG: 0.1 TABLET ORAL at 13:46

## 2021-07-17 ASSESSMENT — MIFFLIN-ST. JEOR: SCORE: 1612.58

## 2021-07-17 NOTE — PLAN OF CARE
CIWA scores 0 overnight. VSS. Pt alert, conversant and appropriate .   Problem: Alcohol Withdrawal  Goal: Alcohol Withdrawal Symptom Control  Outcome: Improving     Problem: Acute Neurologic Deterioration (Alcohol Withdrawal)  Goal: Optimal Neurologic Function  Outcome: Improving   Julia Vogt RN

## 2021-07-17 NOTE — PLAN OF CARE
Problem: Adult Inpatient Plan of Care  Goal: Plan of Care Review  Outcome: Improving  Flowsheets (Taken 7/17/2021 1000)  Plan of Care Reviewed With: patient     Problem: Alcohol Withdrawal  Goal: Alcohol Withdrawal Symptom Control  Outcome: Improving     Problem: Acute Neurologic Deterioration (Alcohol Withdrawal)  Goal: Optimal Neurologic Function  Outcome: Improving

## 2021-07-17 NOTE — PROGRESS NOTES
"Primary Medicine Progress Note    Assessment/Plan  Active Problems:    Altered mental status, unspecified altered mental status type      Abhijit Saavedra is a 36 year old male with PMH significant for alcohol abuse disorder who presented via friends for \"severe intoxication\" with AMS. Was intubated for difficulty maintaining airway and admitted to the ICU but self extubated after several hours.  No longer requiring any respiratory support,  but still with concern for withdrawal.  Will transfer to floor when bed becomes available is no longer needing ICU care.  Plan had been to discharge 7/17 but patient does not feel safe to go home - from a mobility/fall standpoint as well as does not feel he would be able to stay away from alcohol just yet. Plan to likely discharge 7/18 with ultimate plan for [facility] evaluation/treatment 7/19.     Severe alcohol intoxication with AMS, previously with withdrawal, now resolved   , no other ingestions on panel, UDS negative. Was severely altered and concern for airway protection, thus intubated/sedated. ABG after intubation 7.48/34/112/27. Lactic acid 4.2 -> 3.9 after 1L NS bolus. CT head normal.  Self extubated self several hours after admission.  Has been receiving scheduled valium/gabapentin.  Recent CIWA's have been 3 ->-> 0 consistently on 7/17. Nurse unconcerned regarding withdrawal and I agree on my assessment.   -Psych consult, appreciate   - consult  -CIWA protocol but move from scheduled to only PRN valium/gabapentin.   -Folate/thiamine/multivitamin  -SCDs, lovenox    Anxiety  Hx of anxiety, worse lately because of stress with job and girlfriend breaking up with him.  -Psych consult   -Vistaril 50mg TID (would likely provide on discharge)   -Consider seroquel 25mg prn TID if needed         Dispo: inpatient status for alcohol intoxication s/p intubation, anticipate discharge to home with ultimate plan for [facility]   Barriers to discharge:  Medications and " monitoring for alcohol withdrawal, safety concerns   Anticipated discharge date:  1 day      Diet: Orders Placed This Encounter      Regular Diet Adult  DVT Prophylaxis: Enoxaparin (Lovenox) SQ and Pneumatic Compression Devices  Fluids: none  Central Lines: None  Code Status: Full Code      Subjective:   No acute events overnight. Now scoring 0's on CIWA and nurse stopped scheduled meds.   He feels alert but like his mentation is slower than he would like. Concerned about permanent brain damage. Discussed this is not likely but also difficult to tell - more likely that he is recovering post his intoxication/withdrawal/sedating medications.   Endorses persistent chest pain but this is markedly improved from even yesterday - felt to be 2/2 to intubation.     He does not feel safe going home - is concerned he will use alcohol or possibly fall. Might hurt himself but will not do so here. No SI.       Objective    Vital signs in last 24 hours Temp:  [98.1  F (36.7  C)-98.4  F (36.9  C)] 98.3  F (36.8  C)  Pulse:  [68-90] 79  Resp:  [10-21] 21  BP: (109-115)/(70-81) 109/74  SpO2:  [94 %-98 %] 98 %       Weight:   Vitals:    07/14/21 1715 07/15/21 0400 07/17/21 0200   Weight: 72.8 kg (160 lb 6.4 oz) 69.4 kg (153 lb 1.6 oz) 71.9 kg (158 lb 8 oz)       Intake/Output last 3 shift I/O last 3 completed shifts:  In: 960 [P.O.:960]  Out: -     Intake/Output this shift:No intake/output data recorded.    PHYSICAL EXAM  GENERAL APPEARANCE: Not in distress, cooperative; appears stated age.  Lying comfortably supine in bed  LUNGS: Lungs CTA  HEART: RRR, no murmurs. Radial pulses 2+. Brisk cap refill  ABDOMEN: Non-distended, soft.  No tenderness  EXTREMITIES: Grossly normal without deformity, no edema  PSYCH: Slightly anxious, with out pressured speech and normal rate and volume. Endorses thoughts that he might hurt himself at home (with alcohol) but denies that he will do this here in the hospital.   NEURO: Oriented to time. No  nystagmus. Pupils symmetric.  No tremor or asterixis. Speech slow. No word finding difficulty.     Pertinent Labs and Pertinent Radiology   Lab Results: personally reviewed.     Recent Labs   Lab 07/15/21  0447   WBC 11.8*   HGB 13.7   HCT 40.8          Recent Labs   Lab 07/15/21  0447 07/14/21  1315    145   CO2 23 25   BUN 6* 10   ALBUMIN  --  4.3   ALKPHOS  --  132*   ALT  --  29   AST  --  25       Radiology Results:   Results for orders placed or performed during the hospital encounter of 07/14/21   CT Head w/o Contrast    Impression    IMPRESSION:    1.  No evidence of acute intracranial hemorrhage or mass effect.   XR Chest Port 1 View    Impression   2 IMPRESSION: ET tube 3.5 cm above the jessica. Chest otherwise negative. Lungs are expanded and clear.        EKG Results: personally reviewed.       Precepted patient with Dr. rosa San DO   [facility] Resident   Pager#[phone]    Parts of this note were created with the assistance of voice recognition software.  The note was reviewed for accuracy.  However, errors in spelling, etc may have resulted.

## 2021-07-18 VITALS
OXYGEN SATURATION: 98 % | DIASTOLIC BLOOD PRESSURE: 75 MMHG | BODY MASS INDEX: 24.88 KG/M2 | RESPIRATION RATE: 21 BRPM | HEIGHT: 67 IN | SYSTOLIC BLOOD PRESSURE: 108 MMHG | HEART RATE: 89 BPM | TEMPERATURE: 98 F | WEIGHT: 158.5 LBS

## 2021-07-18 LAB
MAGNESIUM SERPL-MCNC: 2 MG/DL (ref 1.8–2.6)
PHOSPHATE SERPL-MCNC: 4.3 MG/DL (ref 2.5–4.5)
POTASSIUM BLD-SCNC: 3.9 MMOL/L (ref 3.5–5)

## 2021-07-18 PROCEDURE — 84100 ASSAY OF PHOSPHORUS: CPT | Performed by: INTERNAL MEDICINE

## 2021-07-18 PROCEDURE — 250N000013 HC RX MED GY IP 250 OP 250 PS 637: Performed by: INTERNAL MEDICINE

## 2021-07-18 PROCEDURE — 250N000013 HC RX MED GY IP 250 OP 250 PS 637: Performed by: NURSE PRACTITIONER

## 2021-07-18 PROCEDURE — 99238 HOSP IP/OBS DSCHRG MGMT 30/<: CPT | Mod: GC | Performed by: STUDENT IN AN ORGANIZED HEALTH CARE EDUCATION/TRAINING PROGRAM

## 2021-07-18 PROCEDURE — 36415 COLL VENOUS BLD VENIPUNCTURE: CPT | Performed by: INTERNAL MEDICINE

## 2021-07-18 PROCEDURE — 250N000011 HC RX IP 250 OP 636: Performed by: INTERNAL MEDICINE

## 2021-07-18 PROCEDURE — 84132 ASSAY OF SERUM POTASSIUM: CPT | Performed by: INTERNAL MEDICINE

## 2021-07-18 PROCEDURE — 83735 ASSAY OF MAGNESIUM: CPT | Performed by: INTERNAL MEDICINE

## 2021-07-18 RX ORDER — MULTIPLE VITAMINS W/ MINERALS TAB 9MG-400MCG
1 TAB ORAL DAILY
Qty: 30 TABLET | Refills: 0 | Status: SHIPPED | OUTPATIENT
Start: 2021-07-19

## 2021-07-18 RX ORDER — ACAMPROSATE CALCIUM 333 MG/1
666 TABLET, DELAYED RELEASE ORAL 3 TIMES DAILY
Qty: 540 TABLET | Refills: 0 | Status: SHIPPED | OUTPATIENT
Start: 2021-07-18 | End: 2021-07-18

## 2021-07-18 RX ORDER — ACAMPROSATE CALCIUM 333 MG/1
666 TABLET, DELAYED RELEASE ORAL 3 TIMES DAILY
Qty: 540 TABLET | Refills: 0 | Status: SHIPPED | OUTPATIENT
Start: 2021-07-18

## 2021-07-18 RX ADMIN — THIAMINE HYDROCHLORIDE 100 MG: 100 INJECTION, SOLUTION INTRAMUSCULAR; INTRAVENOUS at 09:29

## 2021-07-18 RX ADMIN — DIAZEPAM 10 MG: 5 TABLET ORAL at 01:52

## 2021-07-18 RX ADMIN — CLONIDINE HYDROCHLORIDE 0.1 MG: 0.1 TABLET ORAL at 06:06

## 2021-07-18 RX ADMIN — Medication 1 TABLET: at 09:29

## 2021-07-18 RX ADMIN — FOLIC ACID 1 MG: 1 TABLET ORAL at 09:29

## 2021-07-18 RX ADMIN — GABAPENTIN 900 MG: 300 CAPSULE ORAL at 06:06

## 2021-07-18 NOTE — DISCHARGE SUMMARY
[facility]  Discharge Summary - Medicine & Pediatrics       Date of Admission:  7/14/2021  Date of Discharge:  7/18/2021 12:15 PM  Discharging Provider: Eileen Lorenzo MD.  Discharge Service: [facility].    Discharge Diagnoses   Alcohol Intoxication and withdrawal    Follow-ups Needed After Discharge   Follow-up Appointments     Follow-up and recommended labs and tests       Follow up with primary care provider, Physician No Ref-Primary, within 7   days for hospital follow- up.  No follow up labs or test are needed.    Follow up with Benita for Alcohol abuse             Unresulted Labs Ordered in the Past 30 Days of this Admission     No orders found from 6/14/2021 to 7/15/2021.      These results will be followed up by PCP.    Discharge Disposition   Discharged to home  Condition at discharge: Stable    Hospital Course   Abhijit Saavedra was admitted on 7/14/2021 for alcohol intoxication with AMS.  The following problems were addressed during his hospitalization:    Alcohol Intoxication with AMS and subsequent withdrawal   , no other ingestions on panel, UDS negative on admission. Was severely altered and concern for airway protection, thus intubated/sedated. ABG after intubation 7.48/34/112/27. Lactic acid 4.2 -> 3.9 after 1L NS bolus. CT head normal.  Self extubated self several hours after admission.  CIWA protocol was ordered and patient with downtrending scores and was at 0 on discharge.   - Continue Acamprosate, prescription refilled  - Continue Multivitamin  - Follow up with Benita    Consultations This Hospital Stay   PSYCHIATRY IP CONSULT  SOCIAL WORK IP CONSULT    Code Status   Prior       The patient was discussed with Dr. Dalia Lorenzo MD  [facility], PGY-3  Pager # [phone]    ______________________________________________________________________    Physical Exam   Vital Signs: Temp: 98  F (36.7   C) Temp src: Oral BP: 108/75 Pulse: 89     SpO2: 98 % O2 Device: None (Room air)    Weight: 158 lbs 8 oz    General appearance: alert, appears stated age, cooperative and no distress.  Head: Normocephalic, atraumatic.  Eyes: conjunctivae/corneas clear.  Throat: MMM.  Lungs: clear to auscultation bilaterally, no increased respiratory effort.  Heart: regular rate and rhythm, no murmurs.  Chest wall: Chest wall tenderness to palpation at sternal area  Abdomen: soft, non-tender; nondistended, no masses, no organomegaly.  Neurologic: Alert and oriented X 3, normal strength and tone.  Psychiatric: mood and affect appropriate.      Primary Care Physician   Physician No Ref-Primary    Discharge Orders      Reason for your hospital stay    Alcohol withdrawal     Follow-up and recommended labs and tests     Follow up with primary care provider, Physician No Ref-Primary, within 7 days for hospital follow- up.  No follow up labs or test are needed.    Follow up with Benita for Alcohol abuse     Activity    Your activity upon discharge: activity as tolerated     Diet    Follow this diet upon discharge:      Regular Diet Adult       Significant Results and Procedures   Results for orders placed or performed during the hospital encounter of 07/14/21   CT Head w/o Contrast    Narrative    EXAM: CT HEAD W/O CONTRAST  LOCATION: [facility]  DATE/TIME: 7/14/2021 2:07 PM    INDICATION: Mental status change, unknown cause  COMPARISON: None.  TECHNIQUE: Routine CT Head without IV contrast. Multiplanar reformats. Dose reduction techniques were used.    FINDINGS:  INTRACRANIAL CONTENTS: No evidence of acute intracranial hemorrhage or mass effect. Brain attenuation morphology are normal. The ventricles and sulci are normal for age. Normal gray-white matter differentiation. The basilar cisterns are patent.    VISUALIZED ORBITS/SINUSES/MASTOIDS: The globes are unremarkable. The partially imaged paranasal sinuses, mastoid air  cells and middle ear cavities are unremarkable.     BONES/SOFT TISSUES: The visualized skull base and calvarium are unremarkable.      Impression    IMPRESSION:    1.  No evidence of acute intracranial hemorrhage or mass effect.   XR Chest Port 1 View    Narrative    EXAM: XR CHEST PORT 1 VIEW  LOCATION: [facility]  DATE/TIME: 7/14/2021 1:30 PM    INDICATION: post intubation  COMPARISON: None.      Impression    IMPRESSION: ET tube 3.5 cm above the jessica. Chest otherwise negative. Lungs are expanded and clear.       Discharge Medications   Discharge Medication List as of 7/18/2021 12:02 PM      START taking these medications    Details   multivitamin w/minerals (THERA-VIT-M) tablet Take 1 tablet by mouth daily, Disp-30 tablet, R-0, Local Print         CONTINUE these medications which have CHANGED    Details   acamprosate (CAMPRAL) 333 MG EC tablet Take 2 tablets (666 mg) by mouth 3 times daily, Disp-540 tablet, R-0, Local Print           Allergies   Allergies   Allergen Reactions     Amoxicillin Rash     Pcn [Penicillins] Rash

## 2021-07-18 NOTE — PROGRESS NOTES
Care Management Discharge Note    Discharge Date: 07/18/2021       Discharge Disposition:  (plans to enroll in inpatient CD treatment 7/19)    Discharge Services: Chemical Dependency Resources    Discharge DME: None    Discharge Transportation: family or friend will provide    Private pay costs discussed: Not applicable    PAS Confirmation Code:  n/a  Patient/family educated on Medicare website which has current facility and service quality ratings: other (see comments) (n/a)    Education Provided on the Discharge Plan:  yes  Persons Notified of Discharge Plans: yes  Patient/Family in Agreement with the Plan: yes    Handoff Referral Completed: yes    Additional Information:  Patient discharged to home via family. Plans to start inpatient chemical dependency treatment at [facility]. Previous social work care manager provided patient with CD resources and sent referral to SUDS program.         Jacque Owens RN

## 2021-07-18 NOTE — PLAN OF CARE
Problem: Adult Inpatient Plan of Care  Goal: Plan of Care Review  Outcome: No Change     Problem: Alcohol Withdrawal  Goal: Alcohol Withdrawal Symptom Control  Outcome: No Change     Problem: Acute Neurologic Deterioration (Alcohol Withdrawal)  Goal: Optimal Neurologic Function  Outcome: No Change

## 2021-07-19 ENCOUNTER — PATIENT OUTREACH (OUTPATIENT)
Dept: CARE COORDINATION | Facility: CLINIC | Age: 36
End: 2021-07-19

## 2021-07-19 DIAGNOSIS — Z71.89 OTHER SPECIFIED COUNSELING: ICD-10-CM

## 2021-07-19 NOTE — PROGRESS NOTES
Clinic Care Coordination Contact  [facility]/Voicemail       Clinical Data: Care Coordinator Outreach  Outreach attempted x 1.  Left message on patient's voicemail with call back information and requested return call.  Plan: Care Coordinator will try to reach patient again in 1-2 business days.

## 2021-07-20 NOTE — PROGRESS NOTES
Clinic Care Coordination Contact  [facility]/Voicemail       Clinical Data: Care Coordinator Outreach  Outreach attempted x 2.  Left message on patient's voicemail with call back information and requested return call.  Plan: Care Coordinator will do no further outreaches at this time.

## 2021-07-23 ENCOUNTER — PATIENT OUTREACH (OUTPATIENT)
Dept: CARE COORDINATION | Facility: CLINIC | Age: 36
End: 2021-07-23

## 2021-07-23 NOTE — PROGRESS NOTES
Clinic Care Coordination Contact  [facility]/Voicemail    The patient left a voicemail for the CHW stating that he was wanting to look at treatment programs for alcohol dependence.     Clinical Data: Care Coordinator Outreach  Outreach attempted x 1.  Left message on patient's voicemail with call back information and requested return call.  Plan: Care Coordinator will not try to reach patient again.    DILSHAD Hua  [phone]  [facility]

## 2021-09-22 ENCOUNTER — HOSPITAL ENCOUNTER (EMERGENCY)
Facility: CLINIC | Age: 36
Discharge: HOME | End: 2021-09-22
Attending: EMERGENCY MEDICINE | Admitting: EMERGENCY MEDICINE

## 2021-09-22 VITALS
DIASTOLIC BLOOD PRESSURE: 93 MMHG | BODY MASS INDEX: 25.06 KG/M2 | HEART RATE: 121 BPM | TEMPERATURE: 98 F | WEIGHT: 160 LBS | RESPIRATION RATE: 16 BRPM | OXYGEN SATURATION: 98 % | SYSTOLIC BLOOD PRESSURE: 139 MMHG

## 2021-09-22 DIAGNOSIS — F10.10 ALCOHOL ABUSE: ICD-10-CM

## 2021-09-22 LAB
ALBUMIN SERPL-MCNC: 4.2 G/DL (ref 3.4–5)
ALCOHOL BREATH TEST: 0.21 (ref 0–0.01)
ALP SERPL-CCNC: 130 U/L (ref 40–150)
ALT SERPL W P-5'-P-CCNC: 34 U/L (ref 0–70)
AMPHETAMINES UR QL SCN: NORMAL
ANION GAP SERPL CALCULATED.3IONS-SCNC: 7 MMOL/L (ref 3–14)
AST SERPL W P-5'-P-CCNC: 26 U/L (ref 0–45)
BARBITURATES UR QL: NORMAL
BASOPHILS # BLD AUTO: 0 10E3/UL (ref 0–0.2)
BASOPHILS NFR BLD AUTO: 0 %
BENZODIAZ UR QL: NORMAL
BILIRUB SERPL-MCNC: 0.5 MG/DL (ref 0.2–1.3)
BUN SERPL-MCNC: 7 MG/DL (ref 7–30)
CALCIUM SERPL-MCNC: 9.2 MG/DL (ref 8.5–10.1)
CANNABINOIDS UR QL SCN: NORMAL
CHLORIDE BLD-SCNC: 107 MMOL/L (ref 94–109)
CO2 SERPL-SCNC: 29 MMOL/L (ref 20–32)
COCAINE UR QL: NORMAL
CREAT SERPL-MCNC: 0.83 MG/DL (ref 0.66–1.25)
EOSINOPHIL # BLD AUTO: 0.1 10E3/UL (ref 0–0.7)
EOSINOPHIL NFR BLD AUTO: 0 %
ERYTHROCYTE [DISTWIDTH] IN BLOOD BY AUTOMATED COUNT: 13.8 % (ref 10–15)
GFR SERPL CREATININE-BSD FRML MDRD: >90 ML/MIN/1.73M2
GLUCOSE BLD-MCNC: 102 MG/DL (ref 70–99)
HCT VFR BLD AUTO: 47.9 % (ref 40–53)
HGB BLD-MCNC: 16.3 G/DL (ref 13.3–17.7)
IMM GRANULOCYTES # BLD: 0 10E3/UL
IMM GRANULOCYTES NFR BLD: 0 %
LYMPHOCYTES # BLD AUTO: 4.2 10E3/UL (ref 0.8–5.3)
LYMPHOCYTES NFR BLD AUTO: 37 %
MCH RBC QN AUTO: 29.9 PG (ref 26.5–33)
MCHC RBC AUTO-ENTMCNC: 34 G/DL (ref 31.5–36.5)
MCV RBC AUTO: 88 FL (ref 78–100)
MONOCYTES # BLD AUTO: 0.7 10E3/UL (ref 0–1.3)
MONOCYTES NFR BLD AUTO: 6 %
NEUTROPHILS # BLD AUTO: 6.4 10E3/UL (ref 1.6–8.3)
NEUTROPHILS NFR BLD AUTO: 57 %
NRBC # BLD AUTO: 0 10E3/UL
NRBC BLD AUTO-RTO: 0 /100
OPIATES UR QL SCN: NORMAL
PLATELET # BLD AUTO: 292 10E3/UL (ref 150–450)
POTASSIUM BLD-SCNC: 3.5 MMOL/L (ref 3.4–5.3)
PROT SERPL-MCNC: 8.3 G/DL (ref 6.8–8.8)
RBC # BLD AUTO: 5.46 10E6/UL (ref 4.4–5.9)
SARS-COV-2 RNA RESP QL NAA+PROBE: NEGATIVE
SODIUM SERPL-SCNC: 143 MMOL/L (ref 133–144)
WBC # BLD AUTO: 11.4 10E3/UL (ref 4–11)

## 2021-09-22 PROCEDURE — 250N000013 HC RX MED GY IP 250 OP 250 PS 637: Performed by: EMERGENCY MEDICINE

## 2021-09-22 PROCEDURE — C9803 HOPD COVID-19 SPEC COLLECT: HCPCS | Performed by: EMERGENCY MEDICINE

## 2021-09-22 PROCEDURE — 80307 DRUG TEST PRSMV CHEM ANLYZR: CPT | Performed by: EMERGENCY MEDICINE

## 2021-09-22 PROCEDURE — 99283 EMERGENCY DEPT VISIT LOW MDM: CPT | Performed by: EMERGENCY MEDICINE

## 2021-09-22 PROCEDURE — 36415 COLL VENOUS BLD VENIPUNCTURE: CPT | Performed by: EMERGENCY MEDICINE

## 2021-09-22 PROCEDURE — 85025 COMPLETE CBC W/AUTO DIFF WBC: CPT | Performed by: EMERGENCY MEDICINE

## 2021-09-22 PROCEDURE — U0003 INFECTIOUS AGENT DETECTION BY NUCLEIC ACID (DNA OR RNA); SEVERE ACUTE RESPIRATORY SYNDROME CORONAVIRUS 2 (SARS-COV-2) (CORONAVIRUS DISEASE [COVID-19]), AMPLIFIED PROBE TECHNIQUE, MAKING USE OF HIGH THROUGHPUT TECHNOLOGIES AS DESCRIBED BY CMS-2020-01-R: HCPCS | Performed by: EMERGENCY MEDICINE

## 2021-09-22 PROCEDURE — 99284 EMERGENCY DEPT VISIT MOD MDM: CPT | Performed by: EMERGENCY MEDICINE

## 2021-09-22 PROCEDURE — 82075 ASSAY OF BREATH ETHANOL: CPT | Performed by: EMERGENCY MEDICINE

## 2021-09-22 PROCEDURE — 82040 ASSAY OF SERUM ALBUMIN: CPT | Performed by: EMERGENCY MEDICINE

## 2021-09-22 RX ORDER — LANOLIN ALCOHOL/MO/W.PET/CERES
100 CREAM (GRAM) TOPICAL DAILY
Status: DISCONTINUED | OUTPATIENT
Start: 2021-09-22 | End: 2021-09-23 | Stop reason: HOSPADM

## 2021-09-22 RX ORDER — FOLIC ACID 1 MG/1
1 TABLET ORAL DAILY
Status: DISCONTINUED | OUTPATIENT
Start: 2021-09-22 | End: 2021-09-23 | Stop reason: HOSPADM

## 2021-09-22 RX ORDER — MULTIPLE VITAMINS W/ MINERALS TAB 9MG-400MCG
1 TAB ORAL DAILY
Status: DISCONTINUED | OUTPATIENT
Start: 2021-09-22 | End: 2021-09-23 | Stop reason: HOSPADM

## 2021-09-22 RX ORDER — PHENOL 1.4 %
AEROSOL, SPRAY (ML) MUCOUS MEMBRANE
COMMUNITY
Start: 2021-02-23

## 2021-09-22 RX ORDER — HYDROXYZINE HYDROCHLORIDE 25 MG/1
12.5-5 TABLET, FILM COATED ORAL
COMMUNITY
Start: 2020-10-28

## 2021-09-22 RX ORDER — DIAZEPAM 5 MG
5-20 TABLET ORAL EVERY 30 MIN PRN
Status: DISCONTINUED | OUTPATIENT
Start: 2021-09-22 | End: 2021-09-23 | Stop reason: HOSPADM

## 2021-09-22 RX ADMIN — DIAZEPAM 10 MG: 5 TABLET ORAL at 22:47

## 2021-09-23 ASSESSMENT — ENCOUNTER SYMPTOMS
NECK PAIN: 0
ABDOMINAL PAIN: 0
BACK PAIN: 0
HEADACHES: 0
DYSPHORIC MOOD: 1
DIFFICULTY URINATING: 0
CHILLS: 0
SHORTNESS OF BREATH: 0
NAUSEA: 0
NERVOUS/ANXIOUS: 0
FEVER: 0
VOMITING: 0

## 2021-09-23 NOTE — DISCHARGE INSTRUCTIONS
Please use the below resources and your primary care physician to safely cease alcohol and/or substance use.     Return to the ED if you are having any urgent/life-threatening concerns.     DISCHARGE RESOURCES:  -[place] [facility]: [phone] (detox), [phone] (outpatient & [facility])  -[facility] Det[facility] ([address]): [phone]  -[facility] Detox: [phone]  -[place] [facility]: [phone]  -[facility] - self management for addiction recovery:  www.smartrecovery.org    -[facility] Support [facility]: [phone].  -[facility] [phone]   -Substance Abuse and Mental Health Services (www.samhsa.gov)  -Harm Reduction Coalition (www. Harmreduction.org)  -[place] Opioid Prevention Coalition: www.opioidcoalition.org  -Poison control 1-716.266.8738       Sober Support Group Information:  AA/NA & Sponsor/Support  -Alcoholics Anonymous (www.alcoholics-anonymous.org): for local information 24 hours/day  -AA Intergroup service office in [place] (http://www.aastpaul.org/) [phone]  -AA Intergroup service office in [facility]: [phone]. (http://www.a[place].org/)  -Narcotics Anonymous (www.nam[place].org) ([phone]   -Sober Fun Activities: www.sober-activities.[place].[employer]/[facility]//[facility] Recovery Connection ([facility])  [facility] connects people seeking recovery to resources that help foster and sustain long-term recovery.  Whether you are seeking resources for treatment, transportation, housing, job training, education, health care or other pathways to recovery, [facility] is a great place to start.   Phone: [phone]. www.[place] (Great listing of all types of recovery and non-recovery related resources)

## 2021-09-23 NOTE — ED TRIAGE NOTES
Here seeking detox from alcohol.  Pt. states was sober for 2 months until today when he drank a 1/2 liter of vodka.  Last drink about 3 hours ago.  Hx.  withdrawal seizures.

## 2021-09-23 NOTE — ED PROVIDER NOTES
ED Provider Note  [facility]      History     Chief Complaint   Patient presents with     Addiction Problem     HPI  Abhijit Saavedra is a 36 year old male with a PMH significant for alcohol and substance abuse who presents to the ED requesting detox from alcohol. Patient  Reports he was sober for 2 months until he drank a 1/2 liter of vodka today. His last drink was 3 hours ago. Patient has a history of withdrawal seizures.  Denies any illicit drug use.  Denies any chest pain, shortness of breath, fever/chills, nausea/vomiting, and has no medical complaints.  Denies homicidal or suicidal ideation.    Past Medical History  Past Medical History:   Diagnosis Date     Alcohol use      Obstructive sleep apnea      Substance abuse (H)      Past Surgical History:   Procedure Laterality Date     APPENDECTOMY       acamprosate (CAMPRAL) 333 MG EC tablet  hydrOXYzine (ATARAX) 25 MG tablet  Melatonin 10 MG TABS tablet  multivitamin w/minerals (THERA-VIT-M) tablet      Allergies   Allergen Reactions     Amoxicillin Rash     Pcn [Penicillins] Rash     Family History  History reviewed. No pertinent family history.  Social History   Social History     Tobacco Use     Smoking status: Never Smoker     Smokeless tobacco: Never Used   Substance Use Topics     Alcohol use: Yes     Comment: last drank 1/2 liter vodka today     Drug use: Never      Past medical history, past surgical history, medications, allergies, family history, and social history were reviewed with the patient. No additional pertinent items.       Review of Systems   Constitutional: Negative for chills and fever.   Eyes: Negative for visual disturbance.   Respiratory: Negative for shortness of breath.    Cardiovascular: Negative for chest pain.   Gastrointestinal: Negative for abdominal pain, nausea and vomiting.   Genitourinary: Negative for difficulty urinating.   Musculoskeletal: Negative for back pain and neck pain.   Neurological:  Negative for headaches.   Psychiatric/Behavioral: Positive for dysphoric mood. Negative for behavioral problems and suicidal ideas. The patient is not nervous/anxious.      A complete review of systems was performed with pertinent positives and negatives noted in the HPI, and all other systems negative.    Physical Exam   BP: 113/67  Pulse: 108  Temp: 97.9  F (36.6  C)  Resp: 16  Weight: 72.6 kg (160 lb)  SpO2: 99 %  Physical Exam  Vitals and nursing note reviewed.   Constitutional:       General: He is not in acute distress.     Appearance: Normal appearance.   HENT:      Head: Normocephalic.      Nose: Nose normal.   Eyes:      Pupils: Pupils are equal, round, and reactive to light.   Cardiovascular:      Rate and Rhythm: Normal rate and regular rhythm.   Pulmonary:      Effort: Pulmonary effort is normal.   Abdominal:      General: There is no distension.   Musculoskeletal:         General: No deformity. Normal range of motion.      Cervical back: Normal range of motion.   Skin:     General: Skin is warm.   Neurological:      Mental Status: He is alert and oriented to person, place, and time.   Psychiatric:         Mood and Affect: Mood normal.         ED Course      Procedures       The medical record was reviewed and interpreted.  Current labs reviewed and interpreted.  Previous labs reviewed and interpreted.       Results for orders placed or performed during the hospital encounter of 09/22/21   Comprehensive metabolic panel     Status: Abnormal   Result Value Ref Range    Sodium 143 133 - 144 mmol/L    Potassium 3.5 3.4 - 5.3 mmol/L    Chloride 107 94 - 109 mmol/L    Carbon Dioxide (CO2) 29 20 - 32 mmol/L    Anion Gap 7 3 - 14 mmol/L    Urea Nitrogen 7 7 - 30 mg/dL    Creatinine 0.83 0.66 - 1.25 mg/dL    Calcium 9.2 8.5 - 10.1 mg/dL    Glucose 102 (H) 70 - 99 mg/dL    Alkaline Phosphatase 130 40 - 150 U/L    AST 26 0 - 45 U/L    ALT 34 0 - 70 U/L    Protein Total 8.3 6.8 - 8.8 g/dL    Albumin 4.2 3.4 - 5.0  g/dL    Bilirubin Total 0.5 0.2 - 1.3 mg/dL    GFR Estimate >90 >60 mL/min/1.73m2   Asymptomatic COVID-19 Virus (Coronavirus) by PCR Nasopharyngeal     Status: Normal    Specimen: Nasopharyngeal; Swab   Result Value Ref Range    SARS CoV2 PCR Negative Negative    Narrative    Testing was performed using the Xpert Xpress SARS-CoV-2 Assay on the  Cepheid Gene-Xpert Instrument Systems. Additional information about  this Emergency Use Authorization (EUA) assay can be found via the Lab  Guide. This test should be ordered for the detection of SARS-CoV-2 in  individuals who meet SARS-CoV-2 clinical and/or epidemiological  criteria. Test performance is unknown in asymptomatic patients. This  test is for in vitro diagnostic use under the FDA EUA for  laboratories certified under CLIA to perform high complexity testing.  This test has not been FDA cleared or approved. A negative result  does not rule out the presence of PCR inhibitors in the specimen or  target RNA in concentration below the limit of detection for the  assay. The possibility of a false negative should be considered if  the patient's recent exposure or clinical presentation suggests  COVID-19. This test was validated by the [facility]. This laboratory is certified under  the Clinical Laboratory Improvement Amendments of 1988 (CLIA-88) as  qualified to perform high complexity laboratory testing.     CBC with platelets and differential     Status: Abnormal   Result Value Ref Range    WBC Count 11.4 (H) 4.0 - 11.0 10e3/uL    RBC Count 5.46 4.40 - 5.90 10e6/uL    Hemoglobin 16.3 13.3 - 17.7 g/dL    Hematocrit 47.9 40.0 - 53.0 %    MCV 88 78 - 100 fL    MCH 29.9 26.5 - 33.0 pg    MCHC 34.0 31.5 - 36.5 g/dL    RDW 13.8 10.0 - 15.0 %    Platelet Count 292 150 - 450 10e3/uL    % Neutrophils 57 %    % Lymphocytes 37 %    % Monocytes 6 %    % Eosinophils 0 %    % Basophils 0 %    % Immature Granulocytes 0 %    NRBCs per  100 WBC 0 <1 /100    Absolute Neutrophils 6.4 1.6 - 8.3 10e3/uL    Absolute Lymphocytes 4.2 0.8 - 5.3 10e3/uL    Absolute Monocytes 0.7 0.0 - 1.3 10e3/uL    Absolute Eosinophils 0.1 0.0 - 0.7 10e3/uL    Absolute Basophils 0.0 0.0 - 0.2 10e3/uL    Absolute Immature Granulocytes 0.0 <=0.0 10e3/uL    Absolute NRBCs 0.0 10e3/uL   Drug abuse screen 1 urine (ED)     Status: Normal   Result Value Ref Range    Amphetamines Urine Screen Negative Screen Negative    Barbiturates Urine Screen Negative Screen Negative    Benzodiazepines Urine Screen Negative Screen Negative    Cannabinoids Urine Screen Negative Screen Negative    Cocaine Urine Screen Negative Screen Negative    Opiates Urine Screen Negative Screen Negative   Alcohol breath test POCT     Status: Abnormal   Result Value Ref Range    Alcohol Breath Test 0.206 (A) 0.00 - 0.01   CBC with platelets differential     Status: Abnormal    Narrative    The following orders were created for panel order CBC with platelets differential.  Procedure                               Abnormality         Status                     ---------                               -----------         ------                     CBC with platelets and d...[[id number]]  Abnormal            Final result                 Please view results for these tests on the individual orders.   Urine Drugs of Abuse Screen     Status: Normal    Narrative    The following orders were created for panel order Urine Drugs of Abuse Screen.  Procedure                               Abnormality         Status                     ---------                               -----------         ------                     Drug abuse screen 1 urin...[[id number]]  Normal              Final result                 Please view results for these tests on the individual orders.     Medications - No data to display     Assessments & Plan (with Medical Decision Making)   Patient presents to the ED looking for alcohol detox.  Last drink  approximately 3 hours prior to arrival.  Has a history of withdrawal seizures.  Blood alcohol on arrival 0.206.  Physical exam is unremarkable.  No evidence of coingestion, clinical toxidrome, or trauma.  He is not have any physical complaints.    Labs reviewed by me.  No acute abnormalities.  Covid negative.  Patient was referred for alcohol detox.    Prior to admission, patient states that he is no longer interested in detox.  Would rather be discharged.  He had a friend come to the emergency department who is sober and is able to contract for safety.  I had a long conversation with both friend and patient reiterating recommendation for alcohol detox.  Patient understands that he could go through serious withdrawal, have seizures, and die from his alcohol use disorder.  Patient maintains his decision to leave.  No holdable criteria at this time.  Patient invited to return if he changes his mind.    I have reviewed the nursing notes. I have reviewed the findings, diagnosis, plan and need for follow up with the patient.    Discharge Medication List as of 9/22/2021 10:48 PM          Final diagnoses:   Alcohol abuse       --  Anthony Yanes DO  [facility] EMERGENCY DEPARTMENT  9/22/2021     Anthony Yanes DO  09/23/21 1908

## 2022-01-26 NOTE — H&P
"Admission History and Physical   Abhijit Peraza,  1985, MRN [id number]    [unfilled]  Altered mental status, unspecified altered mental status type [R41.82]    PCP: No Ref-Primary, Physician, [unfilled], None   Code status:  Prior       Extended Emergency Contact Information  Primary Emergency Contact: LETI PERAZA  Mobile Phone: [phone]  Relation: Father  Secondary Emergency Contact: JOE PERAZA  Mobile Phone: [phone]  Relation: Mother       Assessment and Plan   Abhijit Peraza is a 36 year old male with PMH significant for alcohol abuse disorder who presented via friends for \"severe intoxication\" with AMS. Was intubated for difficulty maintaining airway and admitted to the ICU.    Active Problems:    Altered mental status, unspecified altered mental status type      Severe alcohol intoxication with AMS, intubated  , no other ingestions on panel. Was severely altered and concern for airway protection, thus intubated/sedated. ABG after intubation 7.48/34/112/27. Lactic acid 4.2 -> 3.9 after 1L NS bolus. CT head normal.  ICU is primary and currently managing, with phalen following peripherally.  PLAN:  -Intubation/sedation per ICU  -Consider PPI for GI ppx given intubation  -Monitor closely for alcohol withdrawal  -Consider nutritional status; folate/thiamine/multivitamin  -Would start dvt ppx       Medication Reconciliation Status: To be performed by ICU, primary    FEN: tbd by primary, ICU  DVT Prophylaxis: tbd by primary, ICU  Code: Full    Dispo: inpatient status for alcohol intoxication s/p intubation, anticipate discharge to home   Barriers to discharge:  work up in progress   Anticipated discharge date:  multiple more days     Chief Complaint: Severe alcohol intoxication     HPI:    Abhijit Peraza is a 36 year old male with PMH significant for alcohol abuse disorder, MDD, anxiety who presented via friends for \"severe intoxication\" with AMS. Was intubated for difficulty maintaining " BMT Teaching Flowsheet    Libby Grimaldo is a 70 year old female  There were no encounter diagnoses.    Teaching Topic: 2016-35    Person(s) involved in teaching: Patient and sister  Motivation Level  Asks Questions: Yes  Eager to Learn: Yes  Cooperative: Yes  Receptive (willing/able to accept information): Yes  Any cultural factors/[employer] beliefs that may influence understanding or compliance? No    Patient and Caregiver demonstrates understanding of the following:  - Reason for the appointment, diagnosis and treatment plan: Yes  - Knowledge of proper use of medications and conditions for which they are ordered (with special attention to potential side effects or drug interactions): Yes  - Which situations necessitate calling provider and whom to contact: Yes    Teaching concerns addressed: None    Proper use and care of (medical equipment, care aids, etc.) NA  Pain management techniques: NA  Patient instructed on hand hygiene: Yes  How and/when to access community resources: Yes    Infection Control:  Patient and Caregiver demonstrates understanding of the following:  Surgical procedure site care taught NA  Signs and symptoms of infection taught Yes  Wound care taught NA  Central venous catheter care taught NA    Instructional Materials Used/Given:   Patient was given and reviewed BMT Teaching Binder, including medication pamphlets, sample treatment calendars, consents, contact phone numbers, hospital and discharge guidelines.  Patient verbalizes understanding of the material and was encouraged to call with any additional questions. .    Research participant Libby Grimaldo was provided the information on the Research Participant Information Sheet by Rashad Chen RN on January 21, 2022. This document contains information regarding the risks of participating in a research study during the COVID-19 pandemic. Receipt of the information sheet was confirmed by study personnel prior to the visit.    Time spent with  airway and admitted to the ICU.    Given patient intubated and friend no longer present without number available, no further hx able to be assessed. Thus chart review from ED note used.    Patient was seen in the ED 7/12/21 for evaluation of anxiety. Patient was feeling anxious after a stressful day of work and noticed his heart rate was in the 140's. He drank ~4 shots and a beer after being sober for 6 months. Patient given 5 mg valium for anxiety and was discharged home with his father will plans to attend an AA meeting.  The patient started taking Sildenafil 20 mg and then became altered, per the patient's co-workers.    History is provided by friend     Medical History  PAST MEDICAL HISTORY:   Past Medical History:   Diagnosis Date     Alcohol use      Obstructive sleep apnea       Surgical History  Reviewed, and He  has no past surgical history on file. Unable to be reviewed given sedated.   Social History  Per review, has never smoked. He has never used smokeless tobacco. Was prior abstinent from alcohol for 6 months but today drank several shots. Does not use drugs.   Allergies  Allergies   Allergen Reactions     Amoxicillin Rash     Pcn [Penicillins] Rash    Family History  Reviewed, and family history is not on file. Unable to be reviewed given sedated. Unable to be further reviewed given sedated.       Prior to Admission Medications   (Not in a hospital admission)       Review of Systems:    12 system review of systems negative except for what's noted in HPI         Physical Exam:  Temp:  [97.8  F (36.6  C)] 97.8  F (36.6  C)  Pulse:  [] 81  Resp:  [14-50] 14  BP: ()/() 97/54  SpO2:  [95 %-100 %] 95 %      General: Sedated, mechanically ventilated. Lying supine.   HEENT: Conjunctivae are nonicteric. Pupils symmetrically 4mm.  Neck: Thyroid gland is normal and nontender without masses. Trachea midline.  Respiratory: Mechanically ventilated. Lung sounds are clear bilaterally without wheezes  patient: 90 minutes.      Specific Concerns: NA   or crackles.   Cardiac: RRR. No m/g/r. Radial pulses 2+ bilaterally, posterior tibial pulses 2+ bilaterally.  Abdominal: Abdomen is soft and non-tender without distention.  Extremities: Upper and lower extremities grossly normal.  Skin: Skin in warm, well perfused.  Lymph: No cervical adenopathy  Neurological: Sedated.  Pupils symmetric.       Pertinent Labs  Lab Results: personally reviewed.   Most Recent 3 CBC's:  Recent Labs   Lab Test 07/14/21  1315 12/18/20  1820   WBC 12.6* 8.8   HGB 17.1 15.5   MCV 87 100    255     Most Recent 3 BMP's:  Recent Labs   Lab Test 07/14/21  1315 12/19/20  0839 12/18/20  1820    135 138   POTASSIUM 3.9 3.8 3.9   CHLORIDE 103 101 101   CO2 25 29 20   BUN 10 9 13   CR 0.79 0.83 0.86   ANIONGAP 17 5 17*   YNES 9.1 8.1* 9.1   * 119* 85     Most Recent 2 LFT's:  Recent Labs   Lab Test 07/14/21  1315 12/19/20  0839   AST 25 183*   ALT 29 268*   ALKPHOS 132* 109   BILITOTAL 0.6 0.9     Most Recent 3 Troponin's:No lab results found.  Most Recent 6 glucoses:  Recent Labs   Lab Test 07/14/21  1315 12/19/20  0839 12/18/20  1820   * 119* 85     Most Recent ABG:  Recent Labs   Lab Test 07/14/21  1418   PH 7.48*   PO2 112*   PCO2 34*   HCO3 27   JAZZMINE 1.9         Pertinent Radiology  Radiology Results: Personally reviewed image/s and personally reviewed impression/s  -Had CT head, normal.      EKG Results: Sinus rhythm with tachycardia at 147        Precepted patient with Dr. Vishnu Gómez    This note was created with the use of voice recognition dictation technology, and as such there may be unintended typographical or voice recognition errors that were not corrected during proofreading.    Sachin Mcwilliams MD  [facility], PGY-1  Pager # [phone]

## 2023-01-01 NOTE — SAFE
Patient pocket knife given to security, unopened pharmacy bag of sildenafil sent to pharmacy.   
Statement Selected

## 2025-05-05 ENCOUNTER — NURSE TRIAGE (OUTPATIENT)
Dept: NURSING | Facility: CLINIC | Age: 40
End: 2025-05-05

## 2025-05-05 NOTE — TELEPHONE ENCOUNTER
"Is not [facility] patient and does not go to doctor on regular bases. Was bitten by deer tick, and [place] days. He was told he may need antibiotics.     Bite shows: No ring or rash at this time, he removed tick and cleaned. He has no other symptoms, tick is flat and not engorged.  Please also see highlighted triage initial assessment questions for further details.     He does not have regular mukesh care. Advised of reasons to establish care and be seen for preventative visits.     Will go to Urgent Care (may not be in [facility] system) to verify if treatment indicated, especially since he will be traveling out of the country in 10 days, he will bring tick.   Alexia Pretty R.N.      Reason for Disposition   [1] Probable deer tick AND [2] attached 36 hours or more (or tick appears swollen, not flat) AND [3] occurred in an area where Lyme disease is common    Additional Information   Negative: Sounds like a life-threatening emergency to the triager   Negative: Not a tick bite   Negative: [1] Fever AND [2] spreading red area or streak   Negative: [1] Fever AND [2] area is very tender to touch   Negative: [1] Red streak or red line AND [2] length > 2 inches (5 cm)   Negative: Can't remove live tick (after trying Care Advice)    Answer Assessment - Initial Assessment Questions  1. ATTACHED:  \"Is the tick still on the skin?\"  (e.g., yes, no, unsure)      No    2. ONSET - TICK STILL ATTACHED:  \"How long do you think the tick has been on your skin?\" (e.g., hours, days, unsure)  Note:  Is there a recent activity (camping, hiking) where the caller may have been exposed?     12- 24-48 hours was [place].     3. ONSET - TICK NOT STILL ATTACHED: \"If the tick has been removed, how long do you think the tick was attached before you removed it?\" (e.g., 5 hours, 2 days). \"When was this?\"      Not attached, he removed was on for 12-48 hours.     4. LOCATION: \"Where is the tick bite located?\" " "(e.g., arm, leg)      Inner left thigh    5. TYPE of TICK: \"Is it a wood tick or a deer tick?\" (e.g., deer tick, wood tick; unsure)      Deer tick     6. SIZE of TICK: \"How big is the tick?\" (e.g., size of poppy seed, apple seed, watermelon seed; unsure) Note: Deer ticks can be the size of a poppy seed (nymph) or an apple seed (adult).        Poppy seed.     7. ENGORGED: \"Did the tick look flat or engorged (full, swollen)?\" (e.g., flat, engorged; unsure)      Flat    8. OTHER SYMPTOMS: \"Do you have any other symptoms?\" (e.g., fever, rash, redness at bite area, red ring around bite)      He poked at it a lot so is red from that, no ring.   9. PREGNANCY: \"Is there any chance you are pregnant?\" \"When was your last menstrual period?\"      NA    Protocols used: Tick Bite-A-AH    "